# Patient Record
Sex: MALE | Race: WHITE | NOT HISPANIC OR LATINO | Employment: FULL TIME | URBAN - NONMETROPOLITAN AREA
[De-identification: names, ages, dates, MRNs, and addresses within clinical notes are randomized per-mention and may not be internally consistent; named-entity substitution may affect disease eponyms.]

---

## 2018-01-23 ENCOUNTER — HOSPITAL ENCOUNTER (EMERGENCY)
Facility: HOSPITAL | Age: 33
Discharge: HOME | End: 2018-01-23
Attending: NURSE PRACTITIONER | Admitting: NURSE PRACTITIONER
Payer: COMMERCIAL

## 2018-01-23 VITALS
TEMPERATURE: 98.9 F | DIASTOLIC BLOOD PRESSURE: 79 MMHG | HEIGHT: 71 IN | HEART RATE: 84 BPM | OXYGEN SATURATION: 97 % | SYSTOLIC BLOOD PRESSURE: 131 MMHG | RESPIRATION RATE: 20 BRPM

## 2018-01-23 DIAGNOSIS — J06.9 UPPER RESPIRATORY TRACT INFECTION, UNSPECIFIED TYPE: ICD-10-CM

## 2018-01-23 LAB
DEPRECATED S PYO AG THROAT QL EIA: NORMAL
FLUAV+FLUBV AG SPEC QL: NEGATIVE
FLUAV+FLUBV AG SPEC QL: NEGATIVE
SPECIMEN SOURCE: NORMAL
SPECIMEN SOURCE: NORMAL

## 2018-01-23 PROCEDURE — 87880 STREP A ASSAY W/OPTIC: CPT | Performed by: FAMILY MEDICINE

## 2018-01-23 PROCEDURE — 87081 CULTURE SCREEN ONLY: CPT | Performed by: FAMILY MEDICINE

## 2018-01-23 PROCEDURE — 87804 INFLUENZA ASSAY W/OPTIC: CPT | Mod: 59 | Performed by: FAMILY MEDICINE

## 2018-01-23 PROCEDURE — 40000275 ZZH STATISTIC RCP TIME EA 10 MIN

## 2018-01-23 PROCEDURE — 99203 OFFICE O/P NEW LOW 30 MIN: CPT | Performed by: NURSE PRACTITIONER

## 2018-01-23 PROCEDURE — 94664 DEMO&/EVAL PT USE INHALER: CPT

## 2018-01-23 PROCEDURE — G0463 HOSPITAL OUTPT CLINIC VISIT: HCPCS | Mod: 25

## 2018-01-23 RX ORDER — ALBUTEROL SULFATE 90 UG/1
2 AEROSOL, METERED RESPIRATORY (INHALATION) EVERY 4 HOURS PRN
Qty: 1 INHALER | Refills: 0 | Status: SHIPPED | OUTPATIENT
Start: 2018-01-23 | End: 2022-08-22

## 2018-01-23 ASSESSMENT — ENCOUNTER SYMPTOMS
CHILLS: 1
SINUS PRESSURE: 1
SORE THROAT: 1
HEADACHES: 1
MYALGIAS: 1
FATIGUE: 1
COUGH: 1
APPETITE CHANGE: 1
ARTHRALGIAS: 1
FEVER: 1

## 2018-01-23 NOTE — ED AVS SNAPSHOT
[facility]    [address] [address]    Phone:  [phone]                                       Odilon Enriquez   MRN: [id number]    Department:  [facility]   Date of Visit:  1/23/2018           Patient Information     Date Of Birth          1985        Your diagnoses for this visit were:     Upper respiratory tract infection, unspecified type        You were seen by Malathi Patel NP.      Follow-up Information     Follow up with [facility].    Specialty:  EMERGENCY MEDICINE    Why:  As needed, If symptoms worsen, or concerns develop    Contact information:    [address] [address] [phone]    Additional information:    From [facility]: Take [address]. Turn left at [address]. Turn left at the first stoplight on [address]. At the first stop sign, take a right onto [address]. Take a left into the parking lot and continue through until you reach the North enterance of the building.       From [place]: Take [address]. Take the [address] ramp towards [place]. Turn left onto [address]. Take a slight right onto [address]. Turn left at the first stoplight on [address]. At the first stop sign, take a right onto [address]. Take a left into the parking lot and continue through until you reach the North enterance of the building.       From [place]: Take [address]. Take a right at [address]. At the first stop sign, take a right onto [address]. Take a left into the parking lot and continue through until you reach the North enterance of the building.         Follow up with Janeth Tinoco NP.    Why:  As needed, if symptoms do not improve    Contact information:    [facility]  [address] [facility] [address]  [phone]          Discharge Instructions         Viral Upper Respiratory Illness (Adult)  You have a  viral upper respiratory illness (URI), which is another term for the common cold. This illness is contagious during the first few days. It is spread through the air by coughing and sneezing. It may also be spread by direct contact (touching the sick person and then touching your own eyes, nose, or mouth). Frequent handwashing will decrease risk of spread. Most viral illnesses go away within 7 to 10 days with rest and simple home remedies. Sometimes the illness may last for several weeks. Antibiotics will not kill a virus, and they are generally not prescribed for this condition.    Home care    If symptoms are severe, rest at home for the first 2 to 3 days. When you resume activity, don't let yourself get too tired.    Avoid being exposed to cigarette smoke (yours or others ).    You may use acetaminophen or ibuprofen to control pain and fever, unless another medicine was prescribed. (Note: If you have chronic liver or kidney disease, have ever had a stomach ulcer or gastrointestinal bleeding, or are taking blood-thinning medicines, talk with your healthcare provider before using these medicines.) Aspirin should never be given to anyone under 18 years of age who is ill with a viral infection or fever. It may cause severe liver or brain damage.    Your appetite may be poor, so a light diet is fine. Avoid dehydration by drinking 6 to 8 glasses of fluids per day (water, soft drinks, juices, tea, or soup). Extra fluids will help loosen secretions in the nose and lungs.    Over-the-counter cold medicines will not shorten the length of time you re sick, but they may be helpful for the following symptoms: cough, sore throat, and nasal and sinus congestion. (Note: Do not use decongestants if you have high blood pressure.)  Follow-up care  Follow up with your healthcare provider, or as advised.  When to seek medical advice  Call your healthcare provider right away if any of these occur:    Cough with lots of colored sputum  (mucus)    Severe headache; face, neck, or ear pain    Difficulty swallowing due to throat pain    Fever of 100.4 F (38 C)  Call 911, or get immediate medical care  Call emergency services right away if any of these occur:    Chest pain, shortness of breath, wheezing, or difficulty breathing    Coughing up blood    Inability to swallow due to throat pain  Date Last Reviewed: 9/13/2015 2000-2017 [employer]. [address] [place] [address]. All rights reserved. This information is not intended as a substitute for professional medical care. Always follow your healthcare professional's instructions.             Review of your medicines      START taking        Dose / Directions Last dose taken    albuterol 108 (90 BASE) MCG/ACT Inhaler   Commonly known as:  PROAIR HFA   Dose:  2 puff   Quantity:  1 Inhaler        Inhale 2 puffs into the lungs every 4 hours as needed for shortness of breath / dyspnea   Refills:  0                Prescriptions were sent or printed at these locations (1 Prescription)                   [facility] [address] [place] [address] [address] [address]   [address], [facility] [address]    Telephone:  [phone]   Fax:  [phone]   Hours:                  E-Prescribed (1 of 1)         albuterol (PROAIR HFA) 108 (90 BASE) MCG/ACT Inhaler                Procedures and tests performed during your visit     Beta strep group A culture    Influenza A/B antigen    Rapid strep screen      Orders Needing Specimen Collection     None      Pending Results     Date and Time Order Name Status Description    1/23/2018 1025 Beta strep group A culture In process             Pending Culture Results     Date and Time Order Name Status Description    1/23/2018 1025 Beta strep group A culture In process             Thank you for choosing [facility]       Thank you for choosing [facility] for your care. Our goal is always to provide you with excellent care.  "Hearing back from our patients is one way we can continue to improve our services. Please take a few minutes to complete the written survey that you may receive in the mail after you visit with us. Thank you!        [employer] Information     [employer] lets you send messages to your doctor, view your test results, renew your prescriptions, schedule appointments and more. To sign up, go to [email or web address] . Click on \"Log in\" on the left side of the screen, which will take you to the Welcome page. Then click on \"Sign up Now\" on the right side of the page.     You will be asked to enter the access code listed below, as well as some personal information. Please follow the directions to create your username and password.     Your access code is: [email or web address]  Expires: 2018 11:08 AM     Your access code will  in 90 days. If you need help or a new code, please call your [place] clinic or [phone].        Care EveryWhere ID     This is your Care EveryWhere ID. This could be used by other organizations to access your [facility] medical records  [id number]        Equal Access to Services     EMERITA RICHARDSON : Hadava Piña, glaileo taylor, melissa millan, malia cha . So [facility] [phone].    ATENCIÓN: Si habla español, tiene a thomas disposición servicios gratuitos de asistencia lingüística. Llame al [phone].    We comply with applicable federal civil rights laws and [place] laws. We do not discriminate on the basis of race, color, national origin, age, disability, sex, sexual orientation, or gender identity.            After Visit Summary       This is your record. Keep this with you and show to your community pharmacist(s) and doctor(s) at your next visit.                  "

## 2018-01-23 NOTE — ED AVS SNAPSHOT
[facility]    [address] [address]    Phone:  [phone]                                       Odilon Enriquez   MRN: [id number]    Department[facility]  [facility]   Date of Visit:  1/23/2018           After Visit Summary Signature Page     I have received my discharge instructions, and my questions have been answered. I have discussed any challenges I see with this plan with the nurse or doctor.    ..........................................................................................................................................  Patient/Patient Representative Signature      ..........................................................................................................................................  Patient Representative Print Name and Relationship to Patient    ..................................................               ................................................  Date                                            Time    ..........................................................................................................................................  Reviewed by Signature/Title    ...................................................              ..............................................  Date                                                            Time

## 2018-01-23 NOTE — LETTER
January 23, 2018      To Whom It May Concern:      Odilon Enriquez was seen in our Urgent Care Department today, 01/23/18.  I expect his condition to improve over the next 1-4 days.  He may return to work/school when improved.    Sincerely,        Malathi Patel, NP

## 2018-01-23 NOTE — DISCHARGE INSTRUCTIONS

## 2018-01-23 NOTE — ED NOTES
Pt presents with dry cough,fever, body aches, chest pressure that goes away when he moves around, decreased appetite since Saturday.Took Tylenol Cold  and Sore Throat at 0900.

## 2018-01-23 NOTE — PROGRESS NOTES
MDI with chamber instruct done. Patient understands instruction. Patient sent home with paper instructions and chamber

## 2018-01-23 NOTE — ED PROVIDER NOTES
"  History     Chief Complaint   Patient presents with     Cough     Saturday started with dry cough, now productive of small amt white sputum, fever/chills     The history is provided by the patient. No  was used.     Odilon Enriquez is a 32 year old male who presents today with a CC of cough, fever, chills, body aches, intermittent chest pressure that improves with movement.  Appetite has been poor.  He has been pushing fluids.  He has been taking tylenol cold and sore throat without much improvement.  No known close contacts with similar symptoms, he notes there were a couple of people at work sick.  He denies chronic medical conditions.      Problem List:    There are no active problems to display for this patient.       Past Medical History:    History reviewed. No pertinent past medical history.    Past Surgical History:    No past surgical history on file.    Family History:    No family history on file.    Social History:  Marital Status:   [2]  Social History   Substance Use Topics     Smoking status: Not on file     Smokeless tobacco: Not on file     Alcohol use Not on file        Medications:      albuterol (PROAIR HFA) 108 (90 BASE) MCG/ACT Inhaler         Review of Systems   Constitutional: Positive for appetite change, chills, fatigue and fever.   HENT: Positive for sinus pressure and sore throat. Negative for ear pain.    Respiratory: Positive for cough.    Musculoskeletal: Positive for arthralgias and myalgias.   Neurological: Positive for headaches.       Physical Exam   BP: 131/79  Pulse: 84  Temp: 98.9  F (37.2  C)  Resp: 20  Height: 180.3 cm (5' 11\")  SpO2: 97 %      Physical Exam   Constitutional: He is oriented to person, place, and time. He appears well-developed. He is cooperative.  Non-toxic appearance. He appears ill. No distress.   HENT:   Head: Normocephalic and atraumatic.   Right Ear: Tympanic membrane, external ear and ear canal normal.   Left Ear: Tympanic " membrane, external ear and ear canal normal.   Mouth/Throat: Uvula is midline and oropharynx is clear and moist.   Eyes: Conjunctivae are normal.   Neck: Normal range of motion. Neck supple.   Cardiovascular: Normal rate and regular rhythm.    Pulmonary/Chest: Effort normal and breath sounds normal.   Lymphadenopathy:     He has no cervical adenopathy.   Neurological: He is alert and oriented to person, place, and time.   Skin: Skin is warm and dry.   Psychiatric: He has a normal mood and affect. His behavior is normal.   Nursing note and vitals reviewed.      ED Course     ED Course     Procedures    Results for orders placed or performed during the hospital encounter of 01/23/18   Influenza A/B antigen   Result Value Ref Range    Influenza A/B Agn Specimen Nares     Influenza A Negative NEG^Negative    Influenza B Negative NEG^Negative   Rapid strep screen   Result Value Ref Range    Specimen Description Throat     Rapid Strep A Screen       NEGATIVE: No Group A streptococcal antigen detected by immunoassay, await culture report.   Beta strep group A culture   Result Value Ref Range    Specimen Description Throat     Culture Micro No beta hemolytic Streptococcus Group A isolated        Assessments & Plan (with Medical Decision Making)     I have reviewed the nursing notes.    I have reviewed the findings, diagnosis, plan and need for follow up with the patient.  ASSESSMENT / PLAN:  (J06.9) Upper respiratory tract infection, unspecified type  Comment: symptomatic, influenza neg  Plan:  Patient verbally educated and given appropriate education sheets for each of their diagnoses and has no questions.   Symptomatic treatments such as those listed below are recommended as needed:   Take OTC motrin or tylenol as directed on the bottle as needed.   Cool mist humidifier at bedside    Discussed Elderberry supplement for antiviral support   Albuterol as prescribed for feeling of chest tightness - RT instructed on use   May  try safely elevating HOB or sleeping in recliner   Take OTC cold medicine as directed on bottle - check ingredients, many are multi symptom and may contain tylenol or motrin   Frequent sips of non-caffeine fluids, rest, wash hands often   Sinus rinses such as a netti pot may help with sinus symptoms   Return to ED/UC if symptoms worsen or concerns develop: shortness of breath,     chest pain, unable to control fever < 103 with medications, persistent vomiting, signs/symptoms of dehydration.   If symptoms persist follow up with PCP for re-evaluation.      Discharge Medication List as of 1/23/2018 11:08 AM      START taking these medications    Details   albuterol (PROAIR HFA) 108 (90 BASE) MCG/ACT Inhaler Inhale 2 puffs into the lungs every 4 hours as needed for shortness of breath / dyspnea, Disp-1 Inhaler, R-0, E-Prescribe             Final diagnoses:   Upper respiratory tract infection, unspecified type       1/23/2018   [facility]     Malathi Patel NP  01/25/18 0044

## 2018-01-25 LAB
BACTERIA SPEC CULT: NORMAL
SPECIMEN SOURCE: NORMAL

## 2019-08-20 ENCOUNTER — TELEPHONE (OUTPATIENT)
Dept: FAMILY MEDICINE | Facility: OTHER | Age: 34
End: 2019-08-20

## 2019-08-20 ENCOUNTER — ALLIED HEALTH/NURSE VISIT (OUTPATIENT)
Dept: FAMILY MEDICINE | Facility: OTHER | Age: 34
End: 2019-08-20
Attending: NURSE PRACTITIONER
Payer: COMMERCIAL

## 2019-08-20 DIAGNOSIS — Z23 NEED FOR TDAP VACCINATION: Primary | ICD-10-CM

## 2019-08-20 PROCEDURE — 90471 IMMUNIZATION ADMIN: CPT

## 2019-08-20 PROCEDURE — 90715 TDAP VACCINE 7 YRS/> IM: CPT

## 2019-08-20 NOTE — TELEPHONE ENCOUNTER
Patient would like to get a tetanus shot. Stated he hasn't had his since 2002 and he got a small cut from a aniceto piece of metal and would like to get this done. Offered him an appointment with his pcp, but he is unable to come in this morning and would like if he could just get the injection done in the shot room. Please call back [phone] and it is ok to leave message.

## 2019-08-20 NOTE — TELEPHONE ENCOUNTER
Left message for patient to return call. Per Janeth patient is ok to receive the TDAP in the shot room.

## 2021-04-13 ENCOUNTER — OFFICE VISIT (OUTPATIENT)
Dept: CHIROPRACTIC MEDICINE | Facility: OTHER | Age: 36
End: 2021-04-13
Attending: CHIROPRACTOR
Payer: COMMERCIAL

## 2021-04-13 DIAGNOSIS — M99.01 SEGMENTAL AND SOMATIC DYSFUNCTION OF CERVICAL REGION: Primary | ICD-10-CM

## 2021-04-13 DIAGNOSIS — M99.02 SEGMENTAL AND SOMATIC DYSFUNCTION OF THORACIC REGION: ICD-10-CM

## 2021-04-13 DIAGNOSIS — M54.2 CERVICALGIA: ICD-10-CM

## 2021-04-13 PROCEDURE — 98940 CHIROPRACT MANJ 1-2 REGIONS: CPT | Mod: AT | Performed by: CHIROPRACTOR

## 2021-04-14 NOTE — PROGRESS NOTES
Subjective Finding:    Chief compalint: Patient presents with:  Neck Pain: sharp pains  , Pain Scale: 8/10, Intensity: sharp, Duration: 1 day, Radiating: no.    Date of injury:     Activities that the pain restricts:   Home/household/hobbies/social activities: no.  Work duties: no.  Sleep: no.  Makes symptoms better: rest.  Makes symptoms worse: activity, cervical extension and cervical flexion.  Have you seen anyone else for the symptoms? No.  Work related: no.  Automobile related injury: no.    Objective and Assessment:    Posture Analysis:   High shoulder: right.  Head tilt: right.  High iliac crest: .  Head carriage: forward.  Thoracic Kyphosis: neutral.  Lumbar Lordosis: neutral.    Lumbar Range of Motion: .  Cervical Range of Motion: extension decreased, left lateral flexion decreased and right lateral flexion decreased.  Thoracic Range of Motion: .  Extremity Range of Motion: .    Palpation:   Lev scapulae: sharp pain, referred pain: yes    Segmental dysfunction pre-treatment and treatment area: C3, C6, T1 and T3.    Assessment post-treatment:  Cervical: ROM increased.  Thoracic: ROM increased.  Lumbar: .    Comments: .      Complicating Factors: .    Plan / Procedure:    Treatment plan: PRN.  Instructed patient: stretch as instructed at visit.  Short term goals: reduce pain and increase ROM.  Long term goals: restore normal function.  Prognosis: excellent.

## 2021-04-19 ENCOUNTER — OFFICE VISIT (OUTPATIENT)
Dept: CHIROPRACTIC MEDICINE | Facility: OTHER | Age: 36
End: 2021-04-19
Attending: CHIROPRACTOR
Payer: COMMERCIAL

## 2021-04-19 DIAGNOSIS — M99.01 SEGMENTAL AND SOMATIC DYSFUNCTION OF CERVICAL REGION: Primary | ICD-10-CM

## 2021-04-19 DIAGNOSIS — M99.02 SEGMENTAL AND SOMATIC DYSFUNCTION OF THORACIC REGION: ICD-10-CM

## 2021-04-19 DIAGNOSIS — M54.2 CERVICALGIA: ICD-10-CM

## 2021-04-19 PROCEDURE — 98940 CHIROPRACT MANJ 1-2 REGIONS: CPT | Mod: AT | Performed by: CHIROPRACTOR

## 2021-04-22 NOTE — PROGRESS NOTES
Subjective Finding:    Chief compalint: Patient presents with:  Neck Pain  , Pain Scale: 8/10, Intensity: sharp, Duration: 1 day, Radiating: no.    Date of injury:     Activities that the pain restricts:   Home/household/hobbies/social activities: no.  Work duties: no.  Sleep: no.  Makes symptoms better: rest.  Makes symptoms worse: activity, cervical extension and cervical flexion.  Have you seen anyone else for the symptoms? No.  Work related: no.  Automobile related injury: no.    Objective and Assessment:    Posture Analysis:   High shoulder: right.  Head tilt: right.  High iliac crest: .  Head carriage: forward.  Thoracic Kyphosis: neutral.  Lumbar Lordosis: neutral.    Lumbar Range of Motion: .  Cervical Range of Motion: extension decreased, left lateral flexion decreased and right lateral flexion decreased.  Thoracic Range of Motion: .  Extremity Range of Motion: .    Palpation:   Lev scapulae: sharp pain, referred pain: yes    Segmental dysfunction pre-treatment and treatment area: C3, C6, T1 and T3.    Assessment post-treatment:  Cervical: ROM increased.  Thoracic: ROM increased.  Lumbar: .    Comments: .      Complicating Factors: .    Plan / Procedure:    Treatment plan: PRN.  Instructed patient: stretch as instructed at visit.  Short term goals: reduce pain and increase ROM.  Long term goals: restore normal function.  Prognosis: excellent.

## 2022-08-17 ENCOUNTER — TELEPHONE (OUTPATIENT)
Dept: FAMILY MEDICINE | Facility: OTHER | Age: 37
End: 2022-08-17

## 2022-08-22 ENCOUNTER — OFFICE VISIT (OUTPATIENT)
Dept: FAMILY MEDICINE | Facility: OTHER | Age: 37
End: 2022-08-22
Attending: FAMILY MEDICINE
Payer: COMMERCIAL

## 2022-08-22 VITALS
BODY MASS INDEX: 28.59 KG/M2 | TEMPERATURE: 98.9 F | WEIGHT: 205 LBS | DIASTOLIC BLOOD PRESSURE: 70 MMHG | HEART RATE: 74 BPM | OXYGEN SATURATION: 99 % | SYSTOLIC BLOOD PRESSURE: 120 MMHG | RESPIRATION RATE: 16 BRPM

## 2022-08-22 DIAGNOSIS — R35.0 URINARY FREQUENCY: Primary | ICD-10-CM

## 2022-08-22 DIAGNOSIS — N50.89 TESTICLE LUMP: ICD-10-CM

## 2022-08-22 DIAGNOSIS — Z13.6 ENCOUNTER FOR LIPID SCREENING FOR CARDIOVASCULAR DISEASE: ICD-10-CM

## 2022-08-22 DIAGNOSIS — Z13.220 ENCOUNTER FOR LIPID SCREENING FOR CARDIOVASCULAR DISEASE: ICD-10-CM

## 2022-08-22 LAB
ALBUMIN UR-MCNC: NEGATIVE MG/DL
AMORPH CRY #/AREA URNS HPF: ABNORMAL /HPF
ANION GAP SERPL CALCULATED.3IONS-SCNC: 4 MMOL/L (ref 3–14)
APPEARANCE UR: ABNORMAL
BILIRUB UR QL STRIP: NEGATIVE
BUN SERPL-MCNC: 13 MG/DL (ref 7–30)
CALCIUM SERPL-MCNC: 9.1 MG/DL (ref 8.5–10.1)
CHLORIDE BLD-SCNC: 103 MMOL/L (ref 94–109)
CHOLEST SERPL-MCNC: 156 MG/DL
CO2 SERPL-SCNC: 29 MMOL/L (ref 20–32)
COLOR UR AUTO: YELLOW
CREAT SERPL-MCNC: 0.94 MG/DL (ref 0.66–1.25)
FASTING STATUS PATIENT QL REPORTED: NO
GFR SERPL CREATININE-BSD FRML MDRD: >90 ML/MIN/1.73M2
GLUCOSE BLD-MCNC: 107 MG/DL (ref 70–99)
GLUCOSE UR STRIP-MCNC: NEGATIVE MG/DL
HDLC SERPL-MCNC: 28 MG/DL
HGB UR QL STRIP: NEGATIVE
KETONES UR STRIP-MCNC: NEGATIVE MG/DL
LDLC SERPL CALC-MCNC: ABNORMAL MG/DL
LEUKOCYTE ESTERASE UR QL STRIP: NEGATIVE
NITRATE UR QL: NEGATIVE
NONHDLC SERPL-MCNC: 128 MG/DL
PH UR STRIP: 7.5 [PH] (ref 4.7–8)
POTASSIUM BLD-SCNC: 3.8 MMOL/L (ref 3.4–5.3)
RBC URINE: 1 /HPF
SODIUM SERPL-SCNC: 136 MMOL/L (ref 133–144)
SP GR UR STRIP: 1.02 (ref 1–1.03)
SQUAMOUS EPITHELIAL: 0 /HPF
TRIGL SERPL-MCNC: 759 MG/DL
UROBILINOGEN UR STRIP-MCNC: NORMAL MG/DL
WBC URINE: 0 /HPF

## 2022-08-22 PROCEDURE — 36415 COLL VENOUS BLD VENIPUNCTURE: CPT | Performed by: FAMILY MEDICINE

## 2022-08-22 PROCEDURE — 99203 OFFICE O/P NEW LOW 30 MIN: CPT | Performed by: FAMILY MEDICINE

## 2022-08-22 PROCEDURE — 80048 BASIC METABOLIC PNL TOTAL CA: CPT | Performed by: FAMILY MEDICINE

## 2022-08-22 PROCEDURE — 80061 LIPID PANEL: CPT | Performed by: FAMILY MEDICINE

## 2022-08-22 PROCEDURE — 81001 URINALYSIS AUTO W/SCOPE: CPT | Performed by: FAMILY MEDICINE

## 2022-08-22 ASSESSMENT — PAIN SCALES - GENERAL: PAINLEVEL: NO PAIN (0)

## 2022-08-22 ASSESSMENT — ENCOUNTER SYMPTOMS
HEMATURIA: 0
FEVER: 0
ABDOMINAL PAIN: 0

## 2022-08-22 NOTE — PROGRESS NOTES
Assessment & Plan     Urinary frequency  - Basic metabolic panel  - UA reflex to Microscopic and Culture - [place]    Testicle lump  - US Testicular & Scrotum w Doppler Ltd; Future    Encounter for lipid screening for cardiovascular disease  - Lipid Profile (Chol, Trig, HDL, LDL calc)      I'm not feeling a lump today, but we'll get an ultrasound to make sure.  Check UA and BMP for his urinary frequency (chronic and likely due to fluid intake).  Lipid screening as he does not come in often.      KONSTANTIN COBOS,   [facility] - LUIS Donald is a 37 year old accompanied by his spouse, presenting for the following health issues:  Testicular Problem      HPI     37-year-old male accompanied today by his wife.  She states it is very hard to get him to go to the doctor.  About 2 months ago she noticed a scrotal lump which has not resolved and that is why he is here for evaluation at her insistence.  He himself does not notice any lumps or changes.  No pain.  On review of systems, he does have frequent urination however he drinks lots of fluids all day long.  He is not having any hesitancy, retention, reduced flow, urgency, dysuria, hematuria.  No back pain, perineal pain, flank pain.  No concern for any STDs.  No FamHx of testicular cancer.  PSH - vasectomy.    Genitourinary - Male  Onset/Duration: 2 months  Description:   Dysuria (painful urination): No}  Hematuria (blood in urine): No  Frequency: YES- for 24 hour period  Waking at night to urinate: No  Hesitancy (delay in urine): No  Retention (unable to empty): No  Decrease in urinary flow: No  Incontinence: No  Progression of Symptoms:  same  Accompanying Signs & Symptoms:  Fever: No  Back/Flank pain: No  Urethral discharge: No  Testicle lumps/masses/pain: YES  Nausea and/or vomiting: No  Abdominal pain: No  History:   History of frequent UTI s: No  History of kidney stones: No  History of hernias: No  Personal or Family history of  Prostate problems: No  Sexually active: YES  Precipitating or alleviating factors: None  Therapies tried and outcome: none      Review of Systems   Constitutional: Negative for fever.   Gastrointestinal: Negative for abdominal pain.   Genitourinary: Negative for genital sores, hematuria, penile discharge, penile pain, scrotal swelling and testicular pain.            Objective    /70 (BP Location: Left arm, Patient Position: Sitting, Cuff Size: Adult Large)   Pulse 74   Temp 98.9  F (37.2  C) (Tympanic)   Resp 16   Wt 93 kg (205 lb)   SpO2 99%   BMI 28.59 kg/m    Body mass index is 28.59 kg/m .  Physical Exam  Constitutional:       General: He is not in acute distress.     Appearance: Normal appearance.   Cardiovascular:      Rate and Rhythm: Normal rate and regular rhythm.      Heart sounds: Normal heart sounds. No murmur heard.  Pulmonary:      Effort: Pulmonary effort is normal.      Breath sounds: Normal breath sounds. No wheezing.   Abdominal:      General: Bowel sounds are normal.      Palpations: Abdomen is soft.      Tenderness: There is no abdominal tenderness.   Genitourinary:     Penis: Normal.       Testes: Normal.   Neurological:      Mental Status: He is alert and oriented to person, place, and time.                .  ..

## 2022-08-22 NOTE — NURSING NOTE
"No chief complaint on file.      Initial /70 (BP Location: Left arm, Patient Position: Sitting, Cuff Size: Adult Large)   Pulse 74   Temp 98.9  F (37.2  C) (Tympanic)   Resp 16   Wt 93 kg (205 lb)   SpO2 99%   BMI 28.59 kg/m   Estimated body mass index is 28.59 kg/m  as calculated from the following:    Height as of 1/23/18: 1.803 m (5' 11\").    Weight as of this encounter: 93 kg (205 lb).  Medication Reconciliation: complete  Renny Narvaez LPN    "

## 2022-08-24 ENCOUNTER — HOSPITAL ENCOUNTER (OUTPATIENT)
Dept: ULTRASOUND IMAGING | Facility: HOSPITAL | Age: 37
Discharge: HOME | End: 2022-08-24
Attending: FAMILY MEDICINE | Admitting: FAMILY MEDICINE
Payer: COMMERCIAL

## 2022-08-24 DIAGNOSIS — N50.89 TESTICLE LUMP: ICD-10-CM

## 2022-08-24 PROCEDURE — 76870 US EXAM SCROTUM: CPT

## 2023-06-07 ENCOUNTER — OFFICE VISIT (OUTPATIENT)
Dept: OTOLARYNGOLOGY | Facility: OTHER | Age: 38
End: 2023-06-07
Attending: NURSE PRACTITIONER
Payer: COMMERCIAL

## 2023-06-07 ENCOUNTER — TELEPHONE (OUTPATIENT)
Dept: OTOLARYNGOLOGY | Facility: OTHER | Age: 38
End: 2023-06-07

## 2023-06-07 VITALS
WEIGHT: 205 LBS | SYSTOLIC BLOOD PRESSURE: 118 MMHG | HEART RATE: 77 BPM | HEIGHT: 71 IN | TEMPERATURE: 98.6 F | BODY MASS INDEX: 28.7 KG/M2 | OXYGEN SATURATION: 97 % | DIASTOLIC BLOOD PRESSURE: 60 MMHG

## 2023-06-07 DIAGNOSIS — R09.81 NASAL CONGESTION: ICD-10-CM

## 2023-06-07 DIAGNOSIS — J30.2 SEASONAL ALLERGIC RHINITIS, UNSPECIFIED TRIGGER: Primary | ICD-10-CM

## 2023-06-07 DIAGNOSIS — J34.89 SINUS PRESSURE: ICD-10-CM

## 2023-06-07 PROCEDURE — 36415 COLL VENOUS BLD VENIPUNCTURE: CPT | Performed by: NURSE PRACTITIONER

## 2023-06-07 PROCEDURE — 99214 OFFICE O/P EST MOD 30 MIN: CPT | Mod: 25 | Performed by: NURSE PRACTITIONER

## 2023-06-07 PROCEDURE — 31231 NASAL ENDOSCOPY DX: CPT | Performed by: NURSE PRACTITIONER

## 2023-06-07 PROCEDURE — 86003 ALLG SPEC IGE CRUDE XTRC EA: CPT | Mod: 90 | Performed by: NURSE PRACTITIONER

## 2023-06-07 PROCEDURE — 82785 ASSAY OF IGE: CPT | Mod: 90 | Performed by: NURSE PRACTITIONER

## 2023-06-07 RX ORDER — AZELASTINE HYDROCHLORIDE, FLUTICASONE PROPIONATE 137; 50 UG/1; UG/1
1 SPRAY, METERED NASAL 2 TIMES DAILY
Qty: 23 G | Refills: 11 | Status: SHIPPED | OUTPATIENT
Start: 2023-06-07

## 2023-06-07 RX ORDER — CETIRIZINE HYDROCHLORIDE 10 MG/1
20 TABLET ORAL 2 TIMES DAILY
Qty: 180 TABLET | Refills: 3 | Status: SHIPPED | OUTPATIENT
Start: 2023-06-07 | End: 2024-05-20

## 2023-06-07 ASSESSMENT — PAIN SCALES - GENERAL: PAINLEVEL: NO PAIN (0)

## 2023-06-07 NOTE — PATIENT INSTRUCTIONS
Thank you for allowing Malathi Amanda and our ENT team to participate in your care.  If your medications are too expensive, please give the nurse a call.  We can possibly change this medication.  If you have a scheduling or an appointment question please contact our Health Unit Coordinator at their direct line [phone] ext [phone].   ALL nursing questions or concerns can be directed to your ENT nurse at: [phone] - [facility]     Start azelastine-fluticasone (DYMISTA) 137-50 MCG/ACT nasal spray,  Spray 1 spray into both nostrils 2 times daily    Start cetirizine (ZYRTEC) 10 MG tablet, Take 2 tablets (20 mg) by mouth 2 times daily,    Go to lab in the clinic to have an Inhalent Panel taken.  A nurse will call you with results in 2 weeks       Consider immunotherapy (allergy shots or drops)    Consider Septoplasty

## 2023-06-07 NOTE — TELEPHONE ENCOUNTER
Received PA Request from Blanca Lanza for Azelastine-Fluticasone 137-50MCG/ACT suspension.  PA Submitted on CMM, waiting for response.

## 2023-06-07 NOTE — PROGRESS NOTES
Otolaryngology Note         Chief Complaint:     Patient presents with:  Consult: Allergy            History of Present Illness:     Odilon Enriquez is a 38 year old male seen today for nasal congestion, itchy eyes, throat, sneezing    He is taking claritin D daily along with 6-10 benadryl per day    Nasal congestion is his most frustrating symptom.    Symptoms start once the snow starts melting and improve in August.      He has symptoms for the past 10 years, gradually worsening over time  No troubles swallowing  He has tried nasal flonase - worked initially   He has fatigue with benadryl  He is willing to try rinses  He has facial pain and pressure   He feels like his head is a balloon.    Didn't have allergies til 30's  + family history allergies - brother.      No concerns for sleep apnea  No  chronic cough  No shortness of breath    No ear itching or fullness in ears.      Resides in house with a basement.  There is no water or mold.  There is not carpet in the bedroom.    Heat source in home: baseboard/radiator  Pets: 3 dogs, no other pets.           Medications:     Current Outpatient Rx   Medication Sig Dispense Refill     azelastine-fluticasone (DYMISTA) 137-50 MCG/ACT nasal spray Spray 1 spray into both nostrils 2 times daily 23 g 11     cetirizine (ZYRTEC) 10 MG tablet Take 2 tablets (20 mg) by mouth 2 times daily 180 tablet 3            Allergies:     Allergies: Patient has no known allergies.          Past Medical History:     No past medical history on file.         Past Surgical History:     Past Surgical History:   Procedure Laterality Date     VASECTOMY         ENT family history reviewed         Social History:     Social History     Tobacco Use     Smoking status: Every Day     Types: Cigarettes     Start date: 1/1/1997     Smokeless tobacco: Current     Types: Chew   Vaping Use     Vaping status: Some Days   Substance Use Topics     Alcohol use: Yes     Comment: every weekend     Drug use: Never  "           Review of Systems:     ROS: See HPI         Physical Exam:     /60 (BP Location: Right arm, Patient Position: Sitting, Cuff Size: Adult Regular)   Pulse 77   Temp 98.6  F (37  C) (Tympanic)   Ht 1.803 m (5' 11\")   Wt 93 kg (205 lb)   SpO2 97%   BMI 28.59 kg/m      General - The patient is well nourished and well developed, and appears to have good nutritional status.    Head and Face - Normocephalic and atraumatic, with no gross asymmetry noted.  The facial nerve is intact, with strong symmetric movements.  Voice and Breathing - The patient was breathing comfortably without the use of accessory muscles. There was no wheezing, stridor, or stertor.  The patients voice was clear and strong, and had appropriate pitch and quality.  Ears -The external auditory canals are patent, the tympanic membranes are intact without effusion, retraction or mass.  Bony landmarks are intact.  Eyes - Extraocular movements intact, sclera were not icteric or injected, conjunctiva were pink and moist.  Mouth - Examination of the oral cavity showed pink, healthy oral mucosa. No lesions or ulcerations noted.  The tongue was mobile and midline, and the dentition were in good condition.    Throat - The walls of the oropharynx were smooth, pink, moist, symmetric, and had no lesions or ulcerations.  The tonsillar pillars and soft palate were symmetric.  The uvula was midline on elevation.   Neck - No worrisome lymphadenopathy.   Palpation of the thyroid was soft and smooth, with no nodules or goiter appreciated.  The trachea was mobile and midline.  Nose - External contour is symmetric, no gross deflection or scars.  The nasal passages are examined with nasal speculum.   Nasal mucosa is pink and moist with no abnormal mucus.      To evaluate the nose and sinuses, I performed rigid nasal endoscopy.  I sprayed both nares with 2 sprays lidocaine and neosynephrine.     I began with the RIGHT side using a 0 degree rigid nasal " endoscope, and then similarly examined the LEFT side     Findings: right septal spur  Inferior turbinates:  enlarged bilaterally  Middle turbinate and middle meatus:  enlarged  Superior meatus is examined and unremarkable  No Sphenoethmoidal purulence  Mucosa is pale and boggy,  no polyps nor polypoid degeneration  Nasopharynx clear, previous adenoidectomy scar  The patient tolerated the procedure well            Assessment and Plan:       ICD-10-CM    1. Seasonal allergic rhinitis, unspecified trigger  J30.2 Inhalent Panel [place] (Serolab)     Total IgE (Serolab)     cetirizine (ZYRTEC) 10 MG tablet     azelastine-fluticasone (DYMISTA) 137-50 MCG/ACT nasal spray     [place]halent Panel [place] (Serolab)     Total IgE (Serolab)        Start azelastine-fluticasone (DYMISTA) 137-50 MCG/ACT nasal spray,  Spray 1 spray into both nostrils 2 times daily    Start cetirizine (ZYRTEC) 10 MG tablet, Take 2 tablets (20 mg) by mouth 2 times daily,    Go to lab in the clinic to have an Inhalent Panel taken.  A nurse will call you with results in 2 weeks       Consider immunotherapy (allergy shots or drops)    Consider Septoplasty and turbinate reduction    Malathi Patel NP-C  [facility]

## 2023-06-07 NOTE — LETTER
6/7/2023         RE: Odilon Enriquez  [address] [address]        Dear Colleague,    Thank you for referring your patient, Odilon Enriquez, to the [facility]. Please see a copy of my visit note below.    Otolaryngology Note         Chief Complaint:     Patient presents with:  Consult: Allergy            History of Present Illness:     Odilon Enriquez is a 38 year old male seen today for nasal congestion, itchy eyes, throat, sneezing    He is taking claritin D daily along with 6-10 benadryl per day    Nasal congestion is his most frustrating symptom.    Symptoms start once the snow starts melting and improve in August.      He has symptoms for the past 10 years, gradually worsening over time  No troubles swallowing  He has tried nasal flonase - worked initially   He has fatigue with benadryl  He is willing to try rinses  He has facial pain and pressure   He feels like his head is a balloon.    Didn't have allergies til 30's  + family history allergies - brother.      No concerns for sleep apnea  No  chronic cough  No shortness of breath    No ear itching or fullness in ears.      Resides in house with a basement.  There is no water or mold.  There is not carpet in the bedroom.    Heat source in home: baseboard/radiator  Pets: 3 dogs, no other pets.           Medications:     Current Outpatient Rx   Medication Sig Dispense Refill     azelastine-fluticasone (DYMISTA) 137-50 MCG/ACT nasal spray Spray 1 spray into both nostrils 2 times daily 23 g 11     cetirizine (ZYRTEC) 10 MG tablet Take 2 tablets (20 mg) by mouth 2 times daily 180 tablet 3            Allergies:     Allergies: Patient has no known allergies.          Past Medical History:     No past medical history on file.         Past Surgical History:     Past Surgical History:   Procedure Laterality Date     VASECTOMY         ENT family history reviewed         Social History:     Social History     Tobacco Use     Smoking  "status: Every Day     Types: Cigarettes     Start date: 1/1/1997     Smokeless tobacco: Current     Types: Chew   Vaping Use     Vaping status: Some Days   Substance Use Topics     Alcohol use: Yes     Comment: every weekend     Drug use: Never            Review of Systems:     ROS: See HPI         Physical Exam:     /60 (BP Location: Right arm, Patient Position: Sitting, Cuff Size: Adult Regular)   Pulse 77   Temp 98.6  F (37  C) (Tympanic)   Ht 1.803 m (5' 11\")   Wt 93 kg (205 lb)   SpO2 97%   BMI 28.59 kg/m      General - The patient is well nourished and well developed, and appears to have good nutritional status.    Head and Face - Normocephalic and atraumatic, with no gross asymmetry noted.  The facial nerve is intact, with strong symmetric movements.  Voice and Breathing - The patient was breathing comfortably without the use of accessory muscles. There was no wheezing, stridor, or stertor.  The patients voice was clear and strong, and had appropriate pitch and quality.  Ears -The external auditory canals are patent, the tympanic membranes are intact without effusion, retraction or mass.  Bony landmarks are intact.  Eyes - Extraocular movements intact, sclera were not icteric or injected, conjunctiva were pink and moist.  Mouth - Examination of the oral cavity showed pink, healthy oral mucosa. No lesions or ulcerations noted.  The tongue was mobile and midline, and the dentition were in good condition.    Throat - The walls of the oropharynx were smooth, pink, moist, symmetric, and had no lesions or ulcerations.  The tonsillar pillars and soft palate were symmetric.  The uvula was midline on elevation.   Neck - No worrisome lymphadenopathy.   Palpation of the thyroid was soft and smooth, with no nodules or goiter appreciated.  The trachea was mobile and midline.  Nose - External contour is symmetric, no gross deflection or scars.  The nasal passages are examined with nasal speculum.   Nasal mucosa " is pink and moist with no abnormal mucus.      To evaluate the nose and sinuses, I performed rigid nasal endoscopy.  I sprayed both nares with 2 sprays lidocaine and neosynephrine.     I began with the RIGHT side using a 0 degree rigid nasal endoscope, and then similarly examined the LEFT side     Findings: right septal spur  Inferior turbinates:  enlarged bilaterally  Middle turbinate and middle meatus:  enlarged  Superior meatus is examined and unremarkable  No Sphenoethmoidal purulence  Mucosa is pale and boggy,  no polyps nor polypoid degeneration  Nasopharynx clear, previous adenoidectomy scar  The patient tolerated the procedure well            Assessment and Plan:       ICD-10-CM    1. Seasonal allergic rhinitis, unspecified trigger  J30.2 Inhalent Panel [place] (Serolab)     Total IgE (Serolab)     cetirizine (ZYRTEC) 10 MG tablet     azelastine-fluticasone (DYMISTA) 137-50 MCG/ACT nasal spray     [place]halent Panel [place] (Serolab)     Total IgE (Serolab)        Start azelastine-fluticasone (DYMISTA) 137-50 MCG/ACT nasal spray,  Spray 1 spray into both nostrils 2 times daily    Start cetirizine (ZYRTEC) 10 MG tablet, Take 2 tablets (20 mg) by mouth 2 times daily,    Go to lab in the clinic to have an Inhalent Panel taken.  A nurse will call you with results in 2 weeks       Consider immunotherapy (allergy shots or drops)    Consider Septoplasty and turbinate reduction    Malathi MORELOS  [facility]        Again, thank you for allowing me to participate in the care of your patient.        Sincerely,        Malathi Patel NP

## 2023-06-16 NOTE — TELEPHONE ENCOUNTER
Received Denial from [facility] for Azelastine-Fluticasone 137-50MCG/ACT suspension 06/16/2023.      Forms will be scanned to Epic.

## 2023-06-22 LAB
SCANNED LAB RESULT: NORMAL
SCANNED LAB RESULT: NORMAL

## 2023-06-23 NOTE — RESULT ENCOUNTER NOTE
Mild sensitivities to distal, pigweed, sheep [place] (weeds, more common in fall), trey, Poplar (trees) Alternaria (mold)    Moderate sensitivity to dust  Moderate to severe sensitivity to grass  Severe sensitivity to birch    Recommend to continue to follow the discharge instructions that we talked about at his last visit.  May consider allergy immunotherapy in the future.  Likely that spring and summer are going to be his worst seasons, this is consistent with his symptoms.    Follow-up if he is interested in starting immunotherapy.    Thanks JS

## 2024-05-07 ENCOUNTER — TRANSFERRED RECORDS (OUTPATIENT)
Dept: HEALTH INFORMATION MANAGEMENT | Facility: CLINIC | Age: 39
End: 2024-05-07

## 2024-05-07 LAB
ALT SERPL-CCNC: 92 U/L (ref 0–48)
AST SERPL-CCNC: 37 U/L (ref 0–38)
CHOLESTEROL (EXTERNAL): 174 MG/DL
CREATININE (EXTERNAL): 0.9 MG/DL (ref 0.7–1.3)
GFR ESTIMATED (EXTERNAL): 99 ML/MIN/1.73M2
GLUCOSE (EXTERNAL): 81 MG/DL (ref 65–100)
HDLC SERPL-MCNC: 41 MG/DL
LDL CHOLESTEROL CALCULATED (EXTERNAL): 80 MG/DL
POTASSIUM (EXTERNAL): 4.1 MMOL/L (ref 3.5–5.1)
TRIGLYCERIDES (EXTERNAL): 327 MG/DL
TSH SERPL-ACNC: 4.08 UIU/ML (ref 0.55–4.78)

## 2024-05-19 DIAGNOSIS — J30.2 SEASONAL ALLERGIC RHINITIS, UNSPECIFIED TRIGGER: ICD-10-CM

## 2024-05-20 RX ORDER — CETIRIZINE HYDROCHLORIDE 10 MG/1
20 TABLET ORAL 2 TIMES DAILY
Qty: 180 TABLET | Refills: 0 | Status: SHIPPED | OUTPATIENT
Start: 2024-05-20 | End: 2024-08-06

## 2024-05-20 NOTE — TELEPHONE ENCOUNTER
Zyrtec  Last Written Prescription Date: 6/7/23  Last Fill Quantity: 180 # of Refills: 3  Last Office Visit: 6/7/23

## 2024-05-21 ENCOUNTER — TELEPHONE (OUTPATIENT)
Dept: FAMILY MEDICINE | Facility: OTHER | Age: 39
End: 2024-05-21

## 2024-05-21 NOTE — TELEPHONE ENCOUNTER
Spoke with patient, he had results completed from another facility and unable to see them in Care Everywhere, he does have results with him and is going to drop them off tomorrow, they will then be given to his primary to review.

## 2024-05-21 NOTE — TELEPHONE ENCOUNTER
9:52 AM    Reason for Call: OVERBOOK    Patient is discuss lab results    The patient is requesting an appointment for FRIDAY May 24 with RAMIRO Tinoco.    Was an appointment offered for this call? No  If yes : Appointment type              Date    Preferred method for responding to this message: Telephone Call  What is your phone number ? [phone]     If we cannot reach you directly, may we leave a detailed response at the number you provided? Yes    Can this message wait until your PCP/provider returns, if unavailable today? Mary Jane Dos Santos

## 2024-05-21 NOTE — TELEPHONE ENCOUNTER
Patient returning the nurses phone call and he lab results from someplace else he doesn't know the person name or nurses name?     Phone [phone]

## 2024-05-22 ENCOUNTER — TELEPHONE (OUTPATIENT)
Dept: FAMILY MEDICINE | Facility: OTHER | Age: 39
End: 2024-05-22

## 2024-05-22 NOTE — TELEPHONE ENCOUNTER
Called patient to setup appointment to go over lab results with Janeth Tinoco. Left message.  Chapo Mayorga LPN

## 2024-05-23 NOTE — PROGRESS NOTES
"  Assessment & Plan     (D64.9) Anemia, unspecified type  (primary encounter diagnosis)  Plan: CBC with platelets and differential, Iron and         iron binding capacity, Immunos occult blood,         Vitamin B12, Folate, Ferritin        Will recheck labs. If abnormal, will consider colonoscopy.     (R79.89) Elevated LFTs  Plan: Comprehensive metabolic panel (BMP + Alb, Alk         Phos, ALT, AST, Total. Bili, TP), Hepatitis C         Screen Reflex to HCV RNA Quant and Genotype,         Hepatitis B Surface Antibody, Hepatitis B         surface antigen, Hepatitis A antibody IgM,         Reflex INR, GGT        Will recheck today. Does admit to drinking large amounts of alcohol.     (Z11.4) Screening for HIV (human immunodeficiency virus)  Plan: HIV Antigen Antibody Combo        Will notify patient of the results when available and intervene accordingly.     (Z13.220) Lipid screening  Plan: Lipid Profile (Chol, Trig, HDL, LDL calc)        Will notify patient of the results when available and intervene accordingly.       BMI  Estimated body mass index is 31 kg/m  as calculated from the following:    Height as of this encounter: 1.803 m (5' 11\").    Weight as of this encounter: 100.8 kg (222 lb 4.8 oz).   Weight management plan: Discussed healthy diet and exercise guidelines        Petey Donald is a 38 year old, presenting for the following health issues:  Results (Lab Results)    HPI     Patient was recently seen at a local integrative health clinic. Labs were done and he would like to discuss them.     Hgb was low at 12.9. He denies any episodes of bleeding. No dizziness or lightheadedness. Iron levels were also low. Grandfather with stomach cancer. Some heart burn. Does not take anything for this.     AST and ALT were high. Admits to drinking large amounts of alcohol. He notes that he typically drinks 20 drinks were week, varies between 16 ounce beer or shot. No Tylenol use. Did test positive for Hep C in the " "past, but his viral load was zero.     Lipids were also high. Has not eaten today.     No fevers. No weight loss. Some weight gain. No nausea or vomiting. No abdominal pain.         Review of Systems  Constitutional, HEENT, cardiovascular, pulmonary, gi and gu systems are negative, except as otherwise noted.      Objective    /86   Pulse 60   Temp 97.7  F (36.5  C) (Tympanic)   Resp 16   Ht 1.803 m (5' 11\")   Wt 100.8 kg (222 lb 4.8 oz)   SpO2 96%   BMI 31.00 kg/m    Body mass index is 31 kg/m .  Physical Exam   GENERAL: alert and no distress  EYES: Eyes grossly normal to inspection, PERRL and conjunctivae and sclerae normal  HENT: ear canals and TM's normal, nose and mouth without ulcers or lesions  NECK: no adenopathy, no asymmetry, masses, or scars  RESP: lungs clear to auscultation - no rales, rhonchi or wheezes  CV: regular rate and rhythm, normal S1 S2, no S3 or S4, no murmur, click or rub, no peripheral edema  ABDOMEN: soft, nontender, no hepatosplenomegaly, no masses and bowel sounds normal  MS: no gross musculoskeletal defects noted, no edema  NEURO: Normal strength and tone, mentation intact and speech normal  PSYCH: mentation appears normal, affect normal/bright    Labs in process        Signed Electronically by: Janeth Tinoco NP    "

## 2024-05-24 ENCOUNTER — OFFICE VISIT (OUTPATIENT)
Dept: FAMILY MEDICINE | Facility: OTHER | Age: 39
End: 2024-05-24
Attending: NURSE PRACTITIONER
Payer: COMMERCIAL

## 2024-05-24 VITALS
DIASTOLIC BLOOD PRESSURE: 86 MMHG | HEART RATE: 60 BPM | OXYGEN SATURATION: 96 % | BODY MASS INDEX: 31.12 KG/M2 | SYSTOLIC BLOOD PRESSURE: 124 MMHG | TEMPERATURE: 97.7 F | RESPIRATION RATE: 16 BRPM | HEIGHT: 71 IN | WEIGHT: 222.3 LBS

## 2024-05-24 DIAGNOSIS — D64.9 ANEMIA, UNSPECIFIED TYPE: Primary | ICD-10-CM

## 2024-05-24 DIAGNOSIS — Z13.220 LIPID SCREENING: ICD-10-CM

## 2024-05-24 DIAGNOSIS — Z11.4 SCREENING FOR HIV (HUMAN IMMUNODEFICIENCY VIRUS): ICD-10-CM

## 2024-05-24 DIAGNOSIS — R79.89 ELEVATED LFTS: ICD-10-CM

## 2024-05-24 LAB
ALBUMIN SERPL BCG-MCNC: 4.7 G/DL (ref 3.5–5.2)
ALP SERPL-CCNC: 66 U/L (ref 40–150)
ALT SERPL W P-5'-P-CCNC: 127 U/L (ref 0–70)
ANION GAP SERPL CALCULATED.3IONS-SCNC: 8 MMOL/L (ref 7–15)
AST SERPL W P-5'-P-CCNC: 47 U/L (ref 0–45)
BASOPHILS # BLD AUTO: 0.1 10E3/UL (ref 0–0.2)
BASOPHILS NFR BLD AUTO: 1 %
BILIRUB SERPL-MCNC: 1.1 MG/DL
BUN SERPL-MCNC: 8.5 MG/DL (ref 6–20)
CALCIUM SERPL-MCNC: 9.3 MG/DL (ref 8.6–10)
CHLORIDE SERPL-SCNC: 103 MMOL/L (ref 98–107)
CHOLEST SERPL-MCNC: 171 MG/DL
CREAT SERPL-MCNC: 0.95 MG/DL (ref 0.67–1.17)
DEPRECATED HCO3 PLAS-SCNC: 27 MMOL/L (ref 22–29)
EGFRCR SERPLBLD CKD-EPI 2021: >90 ML/MIN/1.73M2
EOSINOPHIL # BLD AUTO: 0.2 10E3/UL (ref 0–0.7)
EOSINOPHIL NFR BLD AUTO: 3 %
ERYTHROCYTE [DISTWIDTH] IN BLOOD BY AUTOMATED COUNT: 15 % (ref 10–15)
FASTING STATUS PATIENT QL REPORTED: YES
FASTING STATUS PATIENT QL REPORTED: YES
FERRITIN SERPL-MCNC: 525 NG/ML (ref 31–409)
FOLATE SERPL-MCNC: 11.3 NG/ML (ref 4.6–34.8)
GLUCOSE SERPL-MCNC: 99 MG/DL (ref 70–99)
HCT VFR BLD AUTO: 40.6 % (ref 40–53)
HDLC SERPL-MCNC: 41 MG/DL
HGB BLD-MCNC: 13 G/DL (ref 13.3–17.7)
IMM GRANULOCYTES # BLD: 0.1 10E3/UL
IMM GRANULOCYTES NFR BLD: 1 %
INR PPP: 0.92 (ref 0.85–1.15)
IRON BINDING CAPACITY (ROCHE): 293 UG/DL (ref 240–430)
IRON SATN MFR SERPL: 44 % (ref 15–46)
IRON SERPL-MCNC: 129 UG/DL (ref 61–157)
LDLC SERPL CALC-MCNC: 97 MG/DL
LYMPHOCYTES # BLD AUTO: 1.9 10E3/UL (ref 0.8–5.3)
LYMPHOCYTES NFR BLD AUTO: 34 %
MCH RBC QN AUTO: 20.8 PG (ref 26.5–33)
MCHC RBC AUTO-ENTMCNC: 32 G/DL (ref 31.5–36.5)
MCV RBC AUTO: 65 FL (ref 78–100)
MONOCYTES # BLD AUTO: 0.5 10E3/UL (ref 0–1.3)
MONOCYTES NFR BLD AUTO: 9 %
NEUTROPHILS # BLD AUTO: 2.8 10E3/UL (ref 1.6–8.3)
NEUTROPHILS NFR BLD AUTO: 52 %
NONHDLC SERPL-MCNC: 130 MG/DL
NRBC # BLD AUTO: 0 10E3/UL
NRBC BLD AUTO-RTO: 0 /100
PLATELET # BLD AUTO: 295 10E3/UL (ref 150–450)
POTASSIUM SERPL-SCNC: 4.2 MMOL/L (ref 3.4–5.3)
PROT SERPL-MCNC: 7.7 G/DL (ref 6.4–8.3)
RBC # BLD AUTO: 6.26 10E6/UL (ref 4.4–5.9)
SODIUM SERPL-SCNC: 138 MMOL/L (ref 135–145)
TRIGL SERPL-MCNC: 165 MG/DL
WBC # BLD AUTO: 5.4 10E3/UL (ref 4–11)

## 2024-05-24 PROCEDURE — 36415 COLL VENOUS BLD VENIPUNCTURE: CPT | Performed by: NURSE PRACTITIONER

## 2024-05-24 PROCEDURE — 87389 HIV-1 AG W/HIV-1&-2 AB AG IA: CPT | Performed by: NURSE PRACTITIONER

## 2024-05-24 PROCEDURE — 86709 HEPATITIS A IGM ANTIBODY: CPT | Performed by: NURSE PRACTITIONER

## 2024-05-24 PROCEDURE — 80053 COMPREHEN METABOLIC PANEL: CPT | Performed by: NURSE PRACTITIONER

## 2024-05-24 PROCEDURE — 83550 IRON BINDING TEST: CPT | Performed by: NURSE PRACTITIONER

## 2024-05-24 PROCEDURE — 86706 HEP B SURFACE ANTIBODY: CPT | Performed by: NURSE PRACTITIONER

## 2024-05-24 PROCEDURE — 82607 VITAMIN B-12: CPT | Performed by: NURSE PRACTITIONER

## 2024-05-24 PROCEDURE — 83540 ASSAY OF IRON: CPT | Performed by: NURSE PRACTITIONER

## 2024-05-24 PROCEDURE — 82746 ASSAY OF FOLIC ACID SERUM: CPT | Performed by: NURSE PRACTITIONER

## 2024-05-24 PROCEDURE — 87522 HEPATITIS C REVRS TRNSCRPJ: CPT | Performed by: NURSE PRACTITIONER

## 2024-05-24 PROCEDURE — 82977 ASSAY OF GGT: CPT | Performed by: NURSE PRACTITIONER

## 2024-05-24 PROCEDURE — 87340 HEPATITIS B SURFACE AG IA: CPT | Performed by: NURSE PRACTITIONER

## 2024-05-24 PROCEDURE — 80061 LIPID PANEL: CPT | Performed by: NURSE PRACTITIONER

## 2024-05-24 PROCEDURE — 86803 HEPATITIS C AB TEST: CPT | Performed by: NURSE PRACTITIONER

## 2024-05-24 PROCEDURE — 85610 PROTHROMBIN TIME: CPT | Performed by: NURSE PRACTITIONER

## 2024-05-24 PROCEDURE — 82728 ASSAY OF FERRITIN: CPT | Performed by: NURSE PRACTITIONER

## 2024-05-24 PROCEDURE — 85025 COMPLETE CBC W/AUTO DIFF WBC: CPT | Performed by: NURSE PRACTITIONER

## 2024-05-24 PROCEDURE — 99214 OFFICE O/P EST MOD 30 MIN: CPT | Performed by: NURSE PRACTITIONER

## 2024-05-24 ASSESSMENT — PAIN SCALES - GENERAL: PAINLEVEL: NO PAIN (0)

## 2024-05-25 LAB
GGT SERPL-CCNC: 51 U/L (ref 8–61)
HAV IGM SERPL QL IA: NONREACTIVE
HBV SURFACE AB SERPL IA-ACNC: <3.5 M[IU]/ML
HBV SURFACE AB SERPL IA-ACNC: NONREACTIVE M[IU]/ML
HBV SURFACE AG SERPL QL IA: NONREACTIVE
HCV AB SERPL QL IA: REACTIVE
HIV 1+2 AB+HIV1 P24 AG SERPL QL IA: NONREACTIVE
VIT B12 SERPL-MCNC: 523 PG/ML (ref 232–1245)

## 2024-05-27 ENCOUNTER — APPOINTMENT (OUTPATIENT)
Dept: LAB | Facility: HOSPITAL | Age: 39
End: 2024-05-27
Payer: COMMERCIAL

## 2024-05-27 DIAGNOSIS — D64.9 ANEMIA, UNSPECIFIED TYPE: Primary | ICD-10-CM

## 2024-05-27 DIAGNOSIS — R79.89 ELEVATED LFTS: ICD-10-CM

## 2024-05-27 LAB — HEMOCCULT SP1 STL QL: NEGATIVE

## 2024-05-27 PROCEDURE — 82274 ASSAY TEST FOR BLOOD FECAL: CPT | Performed by: NURSE PRACTITIONER

## 2024-05-28 LAB — HCV RNA SERPL NAA+PROBE-ACNC: NOT DETECTED IU/ML

## 2024-05-29 ENCOUNTER — LAB (OUTPATIENT)
Dept: LAB | Facility: OTHER | Age: 39
End: 2024-05-29
Payer: COMMERCIAL

## 2024-05-29 ENCOUNTER — HOSPITAL ENCOUNTER (OUTPATIENT)
Dept: ULTRASOUND IMAGING | Facility: HOSPITAL | Age: 39
Discharge: HOME | End: 2024-05-29
Attending: NURSE PRACTITIONER | Admitting: NURSE PRACTITIONER
Payer: COMMERCIAL

## 2024-05-29 DIAGNOSIS — R79.89 ELEVATED LFTS: ICD-10-CM

## 2024-05-29 DIAGNOSIS — D64.9 ANEMIA, UNSPECIFIED TYPE: ICD-10-CM

## 2024-05-29 LAB
BASOPHILS # BLD AUTO: 0 10E3/UL (ref 0–0.2)
BASOPHILS NFR BLD AUTO: 1 %
EOSINOPHIL # BLD AUTO: 0.3 10E3/UL (ref 0–0.7)
EOSINOPHIL NFR BLD AUTO: 6 %
ERYTHROCYTE [DISTWIDTH] IN BLOOD BY AUTOMATED COUNT: 14.6 % (ref 10–15)
HCT VFR BLD AUTO: 39.7 % (ref 40–53)
HGB BLD-MCNC: 12.5 G/DL (ref 13.3–17.7)
IMM GRANULOCYTES # BLD: 0 10E3/UL
IMM GRANULOCYTES NFR BLD: 1 %
LYMPHOCYTES # BLD AUTO: 1.5 10E3/UL (ref 0.8–5.3)
LYMPHOCYTES NFR BLD AUTO: 35 %
MCH RBC QN AUTO: 20.5 PG (ref 26.5–33)
MCHC RBC AUTO-ENTMCNC: 31.5 G/DL (ref 31.5–36.5)
MCV RBC AUTO: 65 FL (ref 78–100)
MONOCYTES # BLD AUTO: 0.5 10E3/UL (ref 0–1.3)
MONOCYTES NFR BLD AUTO: 11 %
NEUTROPHILS # BLD AUTO: 2.1 10E3/UL (ref 1.6–8.3)
NEUTROPHILS NFR BLD AUTO: 47 %
NRBC # BLD AUTO: 0 10E3/UL
NRBC BLD AUTO-RTO: 0 /100
PLATELET # BLD AUTO: 276 10E3/UL (ref 150–450)
RBC # BLD AUTO: 6.09 10E6/UL (ref 4.4–5.9)
RETICS # AUTO: 0.09 10E6/UL (ref 0.03–0.1)
RETICS/RBC NFR AUTO: 1.5 % (ref 0.5–2)
WBC # BLD AUTO: 4.4 10E3/UL (ref 4–11)

## 2024-05-29 PROCEDURE — 82103 ALPHA-1-ANTITRYPSIN TOTAL: CPT | Performed by: NURSE PRACTITIONER

## 2024-05-29 PROCEDURE — 85060 BLOOD SMEAR INTERPRETATION: CPT | Performed by: PATHOLOGY

## 2024-05-29 PROCEDURE — 82390 ASSAY OF CERULOPLASMIN: CPT

## 2024-05-29 PROCEDURE — 86039 ANTINUCLEAR ANTIBODIES (ANA): CPT

## 2024-05-29 PROCEDURE — 85045 AUTOMATED RETICULOCYTE COUNT: CPT

## 2024-05-29 PROCEDURE — 86256 FLUORESCENT ANTIBODY TITER: CPT | Mod: 90 | Performed by: NURSE PRACTITIONER

## 2024-05-29 PROCEDURE — 76705 ECHO EXAM OF ABDOMEN: CPT

## 2024-05-29 PROCEDURE — 83516 IMMUNOASSAY NONANTIBODY: CPT | Mod: 90 | Performed by: NURSE PRACTITIONER

## 2024-05-29 PROCEDURE — 85025 COMPLETE CBC W/AUTO DIFF WBC: CPT

## 2024-05-29 PROCEDURE — 86038 ANTINUCLEAR ANTIBODIES: CPT

## 2024-05-29 PROCEDURE — 36415 COLL VENOUS BLD VENIPUNCTURE: CPT | Performed by: NURSE PRACTITIONER

## 2024-05-30 ENCOUNTER — TELEPHONE (OUTPATIENT)
Dept: CARE COORDINATION | Facility: OTHER | Age: 39
End: 2024-05-30

## 2024-05-30 DIAGNOSIS — K76.0 HEPATIC STEATOSIS: Primary | ICD-10-CM

## 2024-05-30 DIAGNOSIS — E83.00 LOW CERULOPLASMIN LEVEL (H): ICD-10-CM

## 2024-05-30 LAB
A1AT SERPL-MCNC: 112 MG/DL (ref 90–200)
ANA PAT SER IF-IMP: ABNORMAL
ANA SER QL IF: POSITIVE
ANA TITR SER IF: ABNORMAL {TITER}
CERULOPLASMIN SERPL-MCNC: 11 MG/DL (ref 20–60)

## 2024-05-30 NOTE — TELEPHONE ENCOUNTER
Result Notes     Janeth Tinoco NP  5/30/2024  9:54 AM CDT Back to Top      Notify patient.     Liver ultrasound shows some gallstones, but if he is not having pain, we do not do anything.     It also shows that his liver has a lot of fatty tissue. Because if this, I would like him to meet with the GI team at [facility]. If willing, please pend referral to Tl Clark at [facility] GI-diagnosis hepatic steatosis and low cercuplasmin        Janeth Tinoco NP            Patient agreeable to referral. Referral pended.

## 2024-05-31 ENCOUNTER — LAB (OUTPATIENT)
Dept: LAB | Facility: OTHER | Age: 39
End: 2024-05-31
Payer: COMMERCIAL

## 2024-05-31 DIAGNOSIS — R76.8 POSITIVE ANA (ANTINUCLEAR ANTIBODY): Primary | ICD-10-CM

## 2024-05-31 DIAGNOSIS — R76.8 POSITIVE ANA (ANTINUCLEAR ANTIBODY): ICD-10-CM

## 2024-05-31 LAB — SMA IGG SER-ACNC: 21 UNITS

## 2024-05-31 PROCEDURE — 86235 NUCLEAR ANTIGEN ANTIBODY: CPT | Mod: 91

## 2024-05-31 PROCEDURE — 82784 ASSAY IGA/IGD/IGG/IGM EACH: CPT

## 2024-05-31 PROCEDURE — 86160 COMPLEMENT ANTIGEN: CPT | Mod: 91

## 2024-05-31 PROCEDURE — 86200 CCP ANTIBODY: CPT

## 2024-05-31 PROCEDURE — 86235 NUCLEAR ANTIGEN ANTIBODY: CPT

## 2024-05-31 PROCEDURE — 86431 RHEUMATOID FACTOR QUANT: CPT

## 2024-05-31 PROCEDURE — 86225 DNA ANTIBODY NATIVE: CPT

## 2024-05-31 PROCEDURE — 86160 COMPLEMENT ANTIGEN: CPT

## 2024-05-31 PROCEDURE — 36415 COLL VENOUS BLD VENIPUNCTURE: CPT

## 2024-06-01 LAB — SMOOTH MUSCLE IGG TITR SER: ABNORMAL {TITER}

## 2024-06-02 LAB — RHEUMATOID FACT SERPL-ACNC: <10 IU/ML

## 2024-06-03 LAB
C3 SERPL-MCNC: 116 MG/DL (ref 81–157)
C4 SERPL-MCNC: 24 MG/DL (ref 13–39)
IGG SERPL-MCNC: 1172 MG/DL (ref 610–1616)
PATH REPORT.COMMENTS IMP SPEC: NORMAL
PATH REPORT.COMMENTS IMP SPEC: NORMAL
PATH REPORT.FINAL DX SPEC: NORMAL
PATH REPORT.MICROSCOPIC SPEC OTHER STN: NORMAL
PATH REPORT.MICROSCOPIC SPEC OTHER STN: NORMAL
PATH REPORT.RELEVANT HX SPEC: NORMAL

## 2024-06-04 LAB
CCP AB SER IA-ACNC: 0.7 U/ML
CENTROMERE B AB SER-ACNC: <0.7 U/ML
CENTROMERE B AB SER-ACNC: NEGATIVE
DSDNA AB SER-ACNC: 1.2 IU/ML
ENA SM IGG SER IA-ACNC: <0.7 U/ML
ENA SM IGG SER IA-ACNC: NEGATIVE
ENA SS-A AB SER IA-ACNC: <0.5 U/ML
ENA SS-A AB SER IA-ACNC: NEGATIVE
ENA SS-B IGG SER IA-ACNC: <0.6 U/ML
ENA SS-B IGG SER IA-ACNC: NEGATIVE

## 2024-06-05 ENCOUNTER — TELEPHONE (OUTPATIENT)
Dept: FAMILY MEDICINE | Facility: OTHER | Age: 39
End: 2024-06-05

## 2024-06-05 DIAGNOSIS — D64.9 ANEMIA, UNSPECIFIED TYPE: Primary | ICD-10-CM

## 2024-06-05 DIAGNOSIS — R79.9 ABNORMAL BLOOD SMEAR: ICD-10-CM

## 2024-06-11 ENCOUNTER — PREP FOR PROCEDURE (OUTPATIENT)
Dept: SURGERY | Facility: OTHER | Age: 39
End: 2024-06-11

## 2024-06-11 ENCOUNTER — OFFICE VISIT (OUTPATIENT)
Dept: SURGERY | Facility: OTHER | Age: 39
End: 2024-06-11
Attending: NURSE PRACTITIONER
Payer: COMMERCIAL

## 2024-06-11 VITALS
HEIGHT: 71 IN | SYSTOLIC BLOOD PRESSURE: 136 MMHG | HEART RATE: 61 BPM | RESPIRATION RATE: 16 BRPM | DIASTOLIC BLOOD PRESSURE: 76 MMHG | OXYGEN SATURATION: 97 % | WEIGHT: 220 LBS | BODY MASS INDEX: 30.8 KG/M2

## 2024-06-11 DIAGNOSIS — D64.9 ANEMIA, UNSPECIFIED TYPE: ICD-10-CM

## 2024-06-11 DIAGNOSIS — K21.9 GASTROESOPHAGEAL REFLUX DISEASE WITHOUT ESOPHAGITIS: Primary | ICD-10-CM

## 2024-06-11 DIAGNOSIS — D64.9 ANEMIA: ICD-10-CM

## 2024-06-11 DIAGNOSIS — K21.9 GERD (GASTROESOPHAGEAL REFLUX DISEASE): Primary | ICD-10-CM

## 2024-06-11 PROCEDURE — 99213 OFFICE O/P EST LOW 20 MIN: CPT | Performed by: NURSE PRACTITIONER

## 2024-06-11 RX ORDER — BISACODYL 5 MG/1
5 TABLET, DELAYED RELEASE ORAL DAILY PRN
Qty: 4 TABLET | Refills: 0 | Status: SHIPPED | OUTPATIENT
Start: 2024-06-11 | End: 2024-09-19

## 2024-06-11 ASSESSMENT — PAIN SCALES - GENERAL: PAINLEVEL: NO PAIN (0)

## 2024-06-11 NOTE — PATIENT INSTRUCTIONS
Thank you for allowing Deisy Conway CNP and our surgical team to participate in your care. Please call our health unit coordinator at [phone] with scheduling questions or the nurse at [phone] with any other questions or concerns.      You have been scheduled for:  Upper endoscopy and colonoscopy with  on 7/18/24.   You will use golytely bowel prep.  Please see handout for additional instruction.  You will not need a pre-operative appointment with your primary care provider.  You may call [phone] or [phone] with any questions.

## 2024-06-11 NOTE — PROGRESS NOTES
CLINIC NOTE - CONSULT  6/11/2024    Patient : Odilon Enriquez  Referring Physician :  Janeth Tinoco NP    Reason for Referral : Upper and lower endoscopy    This is a 39 year old male with a need for an upper and lower endoscopy.  Upper endoscopy is needed for Gastroesohpageal Reflux and anemia .  Lower endoscopy is needed for Anemia.      Last EGD : Never  History of GERD : YES  History of PUD : NO  On PPI's : Not Applicable    Last colonoscopy : Never  Family history of colon cancer : NO  Family history of colon polyps : NO  Personal history of colon cancer : NO  Personal history of colon polyps : NO  Rectal bleeding : NO  Changes in bowel habits :NO  Personal history of inflammatory bowel disease : NO    Past Medical History:  History reviewed. No pertinent past medical history.    Past Surgical History:  Past Surgical History:   Procedure Laterality Date    VASECTOMY         Family History History:  Family History   Problem Relation Age of Onset    Rheumatoid Arthritis Mother     Stomach Cancer Maternal Grandfather     Esophageal Cancer Maternal Grandfather        History of Tobacco Use:  History   Smoking Status    Former    Types: Cigarettes   Smokeless Tobacco    Current    Types: Chew       Current Medications:  Current Outpatient Medications   Medication Sig Dispense Refill    azelastine-fluticasone (DYMISTA) 137-50 MCG/ACT nasal spray Spray 1 spray into both nostrils 2 times daily 23 g 11    cetirizine (ZYRTEC) 10 MG tablet TAKE 2 TABLETS (20 MG) BY MOUTH 2 TIMES DAILY 180 tablet 0       Allergies:  Allergies   Allergen Reactions    Seasonal Allergies Itching     Sinus blockage       ROS:  Constitutional: negative  Eyes: negative  Ears, nose, mouth, throat, and face: positive for allergies  Respiratory: negative  Cardiovascular: negative  Gastrointestinal: positive for reflux symptoms and anemia  Genitourinary:negative  Integument/breast: negative  Hematologic/lymphatic: negative  Musculoskeletal: positive  "for aches and pains  Neurological: negative  Behavioral/Psych: positive for chewing tobacco, occasional vaping,   Endocrine: negative  Allergic/Immunologic: negative    PHYSICAL EXAM:     Vital signs: /76 (BP Location: Left arm, Cuff Size: Adult Regular)   Pulse 61   Resp 16   Ht 1.803 m (5' 11\")   Wt 99.8 kg (220 lb)   SpO2 97%   BMI 30.68 kg/m     BMI: Body mass index is 30.68 kg/m .   General: Normal, healthy, cooperative, in no acute distress, alert   Skin: no rashes   Lungs: clear to auscultation   CV: Regular rate and rhythm    Abdominal: non-distended, soft, non-tender to palpation   Extremities: No cyanosis, clubbing or edema noted bilaterally in Upper and Lower Extremities   Neurological: without deficit    Assessment:   39 year old male with need for upper endoscopy for Gastroesohpageal Reflux and anemia  and lower endoscopy for Anemia:    Plan:   Will schedule an esophagogastroduodenoscopy and colonoscopy.  The procedures with their risks, benefits and alternatives were explained.  Risks include but are not limited to bleeding, perforation, missing lesions, need for additional procedures, reaction to anesthesia.  All the patients questions were answered.  The patient consents to proceed.  The procedures will be scheduled.     "

## 2024-06-13 ENCOUNTER — TELEPHONE (OUTPATIENT)
Dept: FAMILY MEDICINE | Facility: OTHER | Age: 39
End: 2024-06-13

## 2024-06-13 NOTE — TELEPHONE ENCOUNTER
Multiple attempts to fax referral to [facility] referral dept along with calls. Fax still not rec'd or in system.  Faxed directly to urology [phone]/ phone: [phone].

## 2024-06-20 ENCOUNTER — ONCOLOGY VISIT (OUTPATIENT)
Dept: ONCOLOGY | Facility: OTHER | Age: 39
End: 2024-06-20
Attending: NURSE PRACTITIONER
Payer: COMMERCIAL

## 2024-06-20 VITALS
OXYGEN SATURATION: 97 % | HEIGHT: 71 IN | HEART RATE: 83 BPM | WEIGHT: 218.03 LBS | DIASTOLIC BLOOD PRESSURE: 68 MMHG | TEMPERATURE: 98 F | BODY MASS INDEX: 30.52 KG/M2 | SYSTOLIC BLOOD PRESSURE: 124 MMHG

## 2024-06-20 DIAGNOSIS — R79.9 ABNORMAL BLOOD SMEAR: ICD-10-CM

## 2024-06-20 DIAGNOSIS — D64.9 ANEMIA, UNSPECIFIED TYPE: ICD-10-CM

## 2024-06-20 LAB
BASOPHILS # BLD AUTO: 0.1 10E3/UL (ref 0–0.2)
BASOPHILS NFR BLD AUTO: 1 %
EOSINOPHIL # BLD AUTO: 0.2 10E3/UL (ref 0–0.7)
EOSINOPHIL NFR BLD AUTO: 3 %
ERYTHROCYTE [DISTWIDTH] IN BLOOD BY AUTOMATED COUNT: 14.4 % (ref 10–15)
HCT VFR BLD AUTO: 38.9 % (ref 40–53)
HGB BLD-MCNC: 12.3 G/DL (ref 13.3–17.7)
IMM GRANULOCYTES # BLD: 0 10E3/UL
IMM GRANULOCYTES NFR BLD: 1 %
LAB DIRECTOR COMMENTS: NORMAL
LAB DIRECTOR DISCLAIMER: NORMAL
LAB DIRECTOR INTERPRETATION: NORMAL
LAB DIRECTOR METHODOLOGY: NORMAL
LAB DIRECTOR RESULTS: NORMAL
LDH SERPL L TO P-CCNC: 148 U/L (ref 0–250)
LYMPHOCYTES # BLD AUTO: 2 10E3/UL (ref 0.8–5.3)
LYMPHOCYTES NFR BLD AUTO: 39 %
MCH RBC QN AUTO: 20.8 PG (ref 26.5–33)
MCHC RBC AUTO-ENTMCNC: 31.6 G/DL (ref 31.5–36.5)
MCV RBC AUTO: 66 FL (ref 78–100)
MONOCYTES # BLD AUTO: 0.4 10E3/UL (ref 0–1.3)
MONOCYTES NFR BLD AUTO: 8 %
NEUTROPHILS # BLD AUTO: 2.5 10E3/UL (ref 1.6–8.3)
NEUTROPHILS NFR BLD AUTO: 48 %
NRBC # BLD AUTO: 0 10E3/UL
NRBC BLD AUTO-RTO: 0 /100
PLATELET # BLD AUTO: 287 10E3/UL (ref 150–450)
RBC # BLD AUTO: 5.91 10E6/UL (ref 4.4–5.9)
RETICS # AUTO: 0.09 10E6/UL (ref 0.03–0.1)
RETICS/RBC NFR AUTO: 1.6 % (ref 0.5–2)
SPECIMEN DESCRIPTION: NORMAL
WBC # BLD AUTO: 5.1 10E3/UL (ref 4–11)

## 2024-06-20 PROCEDURE — 81256 HFE GENE: CPT | Performed by: INTERNAL MEDICINE

## 2024-06-20 PROCEDURE — 36415 COLL VENOUS BLD VENIPUNCTURE: CPT | Performed by: INTERNAL MEDICINE

## 2024-06-20 PROCEDURE — 85660 RBC SICKLE CELL TEST: CPT | Mod: 90 | Performed by: INTERNAL MEDICINE

## 2024-06-20 PROCEDURE — 85025 COMPLETE CBC W/AUTO DIFF WBC: CPT | Performed by: INTERNAL MEDICINE

## 2024-06-20 PROCEDURE — G0452 MOLECULAR PATHOLOGY INTERPR: HCPCS | Performed by: PATHOLOGY

## 2024-06-20 PROCEDURE — 83020 HEMOGLOBIN ELECTROPHORESIS: CPT | Mod: 90 | Performed by: INTERNAL MEDICINE

## 2024-06-20 PROCEDURE — 83615 LACTATE (LD) (LDH) ENZYME: CPT | Performed by: INTERNAL MEDICINE

## 2024-06-20 PROCEDURE — 99205 OFFICE O/P NEW HI 60 MIN: CPT | Performed by: INTERNAL MEDICINE

## 2024-06-20 PROCEDURE — 85045 AUTOMATED RETICULOCYTE COUNT: CPT | Performed by: INTERNAL MEDICINE

## 2024-06-20 PROCEDURE — 83021 HEMOGLOBIN CHROMOTOGRAPHY: CPT | Mod: 90 | Performed by: INTERNAL MEDICINE

## 2024-06-20 PROCEDURE — 83010 ASSAY OF HAPTOGLOBIN QUANT: CPT | Performed by: INTERNAL MEDICINE

## 2024-06-20 ASSESSMENT — PAIN SCALES - GENERAL: PAINLEVEL: NO PAIN (0)

## 2024-06-20 ASSESSMENT — PATIENT HEALTH QUESTIONNAIRE - PHQ9: SUM OF ALL RESPONSES TO PHQ QUESTIONS 1-9: 4

## 2024-06-20 NOTE — NURSING NOTE
"Oncology Rooming Note    June 20, 2024 1:46 PM   Odilon Enriquez is a 39 year old male who presents for:    Chief Complaint   Patient presents with    Hematology     New consult. Anemia, Abnormal blood smear     Initial Vitals: /68 (BP Location: Right arm, Patient Position: Sitting, Cuff Size: Adult Large)   Pulse 83   Temp 98  F (36.7  C) (Tympanic)   Ht 1.803 m (5' 11\")   Wt 98.9 kg (218 lb 0.6 oz)   SpO2 97%   BMI 30.41 kg/m   Estimated body mass index is 30.41 kg/m  as calculated from the following:    Height as of this encounter: 1.803 m (5' 11\").    Weight as of this encounter: 98.9 kg (218 lb 0.6 oz). Body surface area is 2.23 meters squared.  No Pain (0) Comment: Data Unavailable   No LMP for male patient.  Allergies reviewed: Yes  Medications reviewed: Yes    Medications: Medication refills not needed today.  Pharmacy name entered into [facility]:    [id number] - [place], [place] - [address]  AT [address]  [id number] - [place], [place] - [address]    Frailty Screening:   Is the patient here for a new oncology consult visit in cancer care? 2. No      Clinical concerns: none       Courtney Hill LPN              "

## 2024-06-20 NOTE — PROGRESS NOTES
HEMATOLOGY CONSULT NOTE  Jun 20, 2024    Reason for consult: Anemia.     HISTORY OF PRESENT ILLNESS  Odilon Enriquez is a 39 year old male with PMH as stated below who is seen in the hematology clinic for anemia.    His history in short is as follows:    Mr Enriquez was having chronic fatigue for a few years.  He was found to have low testosterone and therefore was evaluated by his primary care, Ms. Tinoco     Workup done at that time showed a hemoglobin of 13 on 5/24/2024.  He was also found to have an MCV of 65.  Other workup done showed an elevated AST at 47 and ALT at 127.  Ferritin was also elevated at 525 with iron saturation of 44.    He states he was told around 10 years that he is iron deficient.  He is currently not on iron supplementation.  No prior history of transfusion.    No family history of thalassemia.  Has [place] ancestry.  He recently underwent an ultrasound abdomen for the elevated liver enzymes.  Found to have gallstones.     REVIEW OF SYSTEMS    A 12-point ROS negative except as in HPI      Current Outpatient Medications   Medication Sig Dispense Refill    cetirizine (ZYRTEC) 10 MG tablet TAKE 2 TABLETS (20 MG) BY MOUTH 2 TIMES DAILY 180 tablet 0    azelastine-fluticasone (DYMISTA) 137-50 MCG/ACT nasal spray Spray 1 spray into both nostrils 2 times daily (Patient not taking: Reported on 6/20/2024) 23 g 11    bisacodyl (DULCOLAX) 5 MG EC tablet Take 1 tablet (5 mg) by mouth daily as needed for constipation Take 2 tablets by mouth 2 days prior to procedure. Take the remaining 2 tablets by mouth at 3pm the day prior to procedure. (Patient not taking: Reported on 6/20/2024) 4 tablet 0       Allergies   Allergen Reactions    Seasonal Allergies Itching     Sinus blockage     Immunization History   Administered Date(s) Administered    Flu, Unspecified 11/28/2016    [id number] Novel Flu- Nasal 11/12/2009    Influenza (IIV3) PF 01/11/2013    Influenza Vaccine >6 months,quad, PF 10/07/2014    Influenza  "Vaccine, 6+MO IM (QUADRIVALENT W/PRESERVATIVES) 11/28/2016    TDAP (Adacel,Boostrix) 03/31/2008    TDAP Vaccine (Adacel) 08/20/2019    Tdap (Adacel,boostrix) 03/31/2008       History reviewed. No pertinent past medical history.    Past Surgical History:   Procedure Laterality Date    VASECTOMY         SOCIAL HISTORY  History   Smoking Status    Former    Types: Cigarettes   Smokeless Tobacco    Current    Types: Chew    Social History    Substance and Sexual Activity      Alcohol use: Yes        Comment: every weekend     History   Drug Use Unknown       FAMILY HISTORY  Family History   Problem Relation Age of Onset    Rheumatoid Arthritis Mother     Stomach Cancer Maternal Grandfather     Esophageal Cancer Maternal Grandfather        PHYSICAL EXAMINATION  /68 (BP Location: Right arm, Patient Position: Sitting, Cuff Size: Adult Large)   Pulse 83   Temp 98  F (36.7  C) (Tympanic)   Ht 1.803 m (5' 11\")   Wt 98.9 kg (218 lb 0.6 oz)   SpO2 97%   BMI 30.41 kg/m    Wt Readings from Last 2 Encounters:   06/20/24 98.9 kg (218 lb 0.6 oz)   06/11/24 99.8 kg (220 lb)     Physical Exam  Constitutional:       Appearance: Normal appearance.   Pulmonary:      Effort: Pulmonary effort is normal.   Neurological:      General: No focal deficit present.      Mental Status: He is alert and oriented to person, place, and time.     Laboratory and imaging:   Latest Reference Range & Units 06/20/24 14:42   WBC 4.0 - 11.0 10e3/uL 5.1   Hemoglobin 13.3 - 17.7 g/dL 12.3 (L)   Hematocrit 40.0 - 53.0 % 38.9 (L)   Platelet Count 150 - 450 10e3/uL 287   (L): Data is abnormally low    5/29/2024: Peripheral smear:Mild hypochromic, markedly microcytic anemia without evidence of red cell regeneration     ASSESSMENT AND PLAN    1.  Microcytic anemia:    Anemia with microcytosis seen on labs done 5/24/2024.  No prior lab seen for comparison.  Was told as a child that he has iron deficiency.  Other findings show an elevated RBC count.  He " was also found to have elevated ferritin of 5.5 with iron saturation of 44.  Again no other labs for comparison.    Given that microcytic anemia seen elevated RBC count this is concerning for thalassemia.  Overall this is very likely thalassemia minor as he has not had any history of transfusion.  He does have gallstones ultrasound abdomen.    Will send a hemoglobin electrophoresis.  If the hemoglobin electrophoresis is concerning for thalassemia will also require referral to genetics.  I will also recommend testing for hemochromatosis mutation panel given the elevated ferritin.  Workup done shows a vitamin B12 level and folic acid within normal limits.    2.  Elevated ferritin::    Found to have elevated liver enzymes and elevated ferritin.  Iron saturation is not elevated.  Concern for iron overload is low however there is concern for ischemia and is important to delineate if this elevated ferritin is from possible thalassemia or does he have any liver injury.  He is seeing GI next month.    Again the ferritin is not significantly elevated to be the cause of the elevated liver enzymes however it is important we continue to monitor.    At this time I would recommend he does not take any exogenous iron.  Limit alcohol and red meat.    Will follow-up again in 3 months with iron panel and ferritin.    Total time spent on the patient on day of encounter was 60 minutes doing chart review, review of test results, interpretation of results, patient visit and documentation.         Capri Chua MD

## 2024-06-21 LAB — HAPTOGLOB SERPL-MCNC: 87 MG/DL (ref 30–200)

## 2024-06-24 LAB
HGB A1 MFR BLD: 94.1 %
HGB A2 MFR BLD: 5.1 %
HGB C MFR BLD: 0 %
HGB E MFR BLD: 0 %
HGB F MFR BLD: 0.8 %
HGB FRACT BLD ELPH-IMP: ABNORMAL
HGB OTHER MFR BLD: 0 %
HGB S BLD QL SOLY: ABNORMAL
HGB S MFR BLD: 0 %
PATH INTERP BLD-IMP: ABNORMAL

## 2024-06-27 DIAGNOSIS — D64.9 ANEMIA, UNSPECIFIED TYPE: Primary | ICD-10-CM

## 2024-06-27 NOTE — RESULT ENCOUNTER NOTE
Hemoglobin electrophoresis showed elevated A2 without an elevated hemoglobin F. This can be from Beta thalassemia. I would recommend a referral to genetics for further work up. I will place the referral and they will call him.

## 2024-07-07 ENCOUNTER — HEALTH MAINTENANCE LETTER (OUTPATIENT)
Age: 39
End: 2024-07-07

## 2024-07-09 ENCOUNTER — ANESTHESIA EVENT (OUTPATIENT)
Dept: SURGERY | Facility: HOSPITAL | Age: 39
End: 2024-07-09
Payer: COMMERCIAL

## 2024-07-09 ASSESSMENT — LIFESTYLE VARIABLES: TOBACCO_USE: 1

## 2024-07-09 NOTE — ANESTHESIA PREPROCEDURE EVALUATION
Anesthesia Pre-Procedure Evaluation    Patient: Odilon Enriquez   MRN: [id number] : 1985        Procedure : Procedure(s):  Upper endoscopy and colonoscopy          No past medical history on file.   Past Surgical History:   Procedure Laterality Date     VASECTOMY        Allergies   Allergen Reactions     Seasonal Allergies Itching     Sinus blockage      Social History     Tobacco Use     Smoking status: Former     Types: Cigarettes     Start date: 1997     Smokeless tobacco: Current     Types: Chew   Substance Use Topics     Alcohol use: Yes     Comment: every weekend      Wt Readings from Last 1 Encounters:   24 98.9 kg (218 lb 0.6 oz)        Anesthesia Evaluation   Pt has had prior anesthetic.     No history of anesthetic complications       ROS/MED HX  ENT/Pulmonary:     (+)                tobacco use (chewing tobacco, occasional vaping), Current use,                       Neurologic:  - neg neurologic ROS     Cardiovascular:       METS/Exercise Tolerance:     Hematologic: Comments: Saw hematology 24  Per 24 note: Hemoglobin electrophoresis showed elevated A2 without an elevated hemoglobin F. This can be from Beta thalassemia. I would recommend a referral to genetics for further work up.     (+)      anemia,          Musculoskeletal:  - neg musculoskeletal ROS     GI/Hepatic:     (+) GERD, Asymptomatic on medication,      bowel prep,  cholecystitis/cholelithiasis (24 US: Cholelithiasis without biliary ductal enlargement),          Renal/Genitourinary:  - neg Renal ROS     Endo:     (+)               Obesity,       Psychiatric/Substance Use: Comment: 24: admitted to drinking large amounts of alcohol      Infectious Disease:  - neg infectious disease ROS     Malignancy:  - neg malignancy ROS     Other:  - neg other ROS          Physical Exam    Airway  airway exam normal      Mallampati: II   TM distance: > 3 FB   Neck ROM: full   Mouth opening: > 3 cm    Respiratory Devices and  "Support         Dental       (+) Minor Abnormalities - some fillings, tiny chips      Cardiovascular   cardiovascular exam normal       Rhythm and rate: regular and normal     Pulmonary   pulmonary exam normal        breath sounds clear to auscultation       OUTSIDE LABS:  CBC:   Lab Results   Component Value Date    WBC 5.1 06/20/2024    WBC 4.4 05/29/2024    HGB 12.3 (L) 06/20/2024    HGB 12.5 (L) 05/29/2024    HCT 38.9 (L) 06/20/2024    HCT 39.7 (L) 05/29/2024     06/20/2024     05/29/2024     BMP:   Lab Results   Component Value Date     05/24/2024     08/22/2022    POTASSIUM 4.2 05/24/2024    POTASSIUM 3.8 08/22/2022    CHLORIDE 103 05/24/2024    CHLORIDE 103 08/22/2022    CO2 27 05/24/2024    CO2 29 08/22/2022    BUN 8.5 05/24/2024    BUN 13 08/22/2022    CR 0.95 05/24/2024    CR 0.94 08/22/2022    GLC 99 05/24/2024     (H) 08/22/2022     COAGS:   Lab Results   Component Value Date    INR 0.92 05/24/2024     POC: No results found for: \"BGM\", \"HCG\", \"HCGS\"  HEPATIC:   Lab Results   Component Value Date    ALBUMIN 4.7 05/24/2024    PROTTOTAL 7.7 05/24/2024     (H) 05/24/2024    AST 47 (H) 05/24/2024    GGT 51 05/24/2024    ALKPHOS 66 05/24/2024    BILITOTAL 1.1 05/24/2024     OTHER:   Lab Results   Component Value Date    BERTA 9.3 05/24/2024       Anesthesia Plan    ASA Status:  2    NPO Status:  NPO Appropriate    Anesthesia Type: MAC.     - Reason for MAC: straight local not clinically adequate              Consents    Anesthesia Plan(s) and associated risks, benefits, and realistic alternatives discussed. Questions answered and patient/representative(s) expressed understanding.     - Discussed: Risks, Benefits and Alternatives for BOTH SEDATION and the PROCEDURE were discussed     - Discussed with:  Patient      - Extended Intubation/Ventilatory Support Discussed: No.      - Patient is DNR/DNI Status: No     Use of blood products discussed: No .     Postoperative " "Care            Comments:    Other Comments: Man 6/11 - same day surgical HP           Nellie Mackey, LAST CNP    I have reviewed the pertinent notes and labs in the chart from the past 30 days and (re)examined the patient.  Any updates or changes from those notes are reflected in this note.              # Obesity: Estimated body mass index is 30.41 kg/m  as calculated from the following:    Height as of 6/20/24: 1.803 m (5' 11\").    Weight as of 6/20/24: 98.9 kg (218 lb 0.6 oz).      "

## 2024-07-16 NOTE — DISCHARGE INSTRUCTIONS
Upper GI Endoscopy: What to Expect at Home  Your Recovery  You had an upper GI endoscopy. Your doctor used a thin, lighted tube that bends to look at the inside of your esophagus, your stomach, and the first part of the small intestine, called the duodenum.  After you have an endoscopy, you will stay at the hospital or clinic for 1 to 2 hours. This will allow the medicine to wear off. You will be able to go home after your doctor or nurse checks to make sure that you're not having any problems.  You may have to stay overnight if you had treatment during the test. You may have a sore throat for a day or two after the test.  This care sheet gives you a general idea about what to expect after the test.  How can you care for yourself at home?  Activity   Rest as much as you need to after you go home.  You should be able to go back to your usual activities the day after the test.  Diet   Follow your doctor's directions for eating after the test.  Drink plenty of fluids (unless your doctor has told you not to).  Medications   If you have a sore throat the day after the test, use an over-the-counter spray to numb your throat.  Follow-up care is a key part of your treatment and safety. Be sure to make and go to all appointments, and call your doctor if you are having problems. It's also a good idea to know your test results and keep a list of the medicines you take.  When should you call for help?   Call 911 anytime you think you may need emergency care. For example, call if:    You passed out (lost consciousness).     You have trouble breathing.     You pass maroon or bloody stools.   Call your doctor now or seek immediate medical care if:    You have pain that does not get better after your take pain medicine.     You have new or worse belly pain.     You have blood in your stools.     You are sick to your stomach and cannot keep fluids down.     You have a fever.     You cannot pass stools or gas.   Watch closely for  "changes in your health, and be sure to contact your doctor if:    Your throat still hurts after a day or two.     You do not get better as expected.   Where can you learn more?  Go to https://www.[employer].net/patiented  Enter J454 in the search box to learn more about \"Upper GI Endoscopy: What to Expect at Home.\"  Current as of: October 19, 2023               Content Version: 14.0    6865-2934 [employer].   Care instructions adapted under license by your healthcare professional. If you have questions about a medical condition or this instruction, always ask your healthcare professional. [employer] disclaims any warranty or liability for your use of this information.    Colonoscopy: What to Expect at Home  Your Recovery  After a colonoscopy, you'll stay at the clinic until you wake up. Then you can go home. But you'll need to arrange for a ride. Your doctor will tell you when you can eat and do your other usual activities.  Your doctor will talk to you about when you'll need your next colonoscopy. Your doctor can help you decide how often you need to be checked. This will depend on the results of your test and your risk for colorectal cancer.  After the test, you may be bloated or have gas pains. You may need to pass gas. If a biopsy was done or a polyp was removed, you may have streaks of blood in your stool (feces) for a few days. Problems such as heavy rectal bleeding may not occur until several weeks after the test. This isn't common. But it can happen after polyps are removed.  This care sheet gives you a general idea about how long it will take for you to recover. But each person recovers at a different pace. Follow the steps below to get better as quickly as possible.  How can you care for yourself at home?  Activity    Rest when you feel tired.     You can do your normal activities when it feels okay to do so.   Diet    Follow your doctor's directions for eating.     Unless your " doctor has told you not to, drink plenty of fluids. This helps to replace the fluids that were lost during the colon prep.     Do not drink alcohol.   Medicines    Your doctor will tell you if and when you can restart your medicines. You will also be given instructions about taking any new medicines.     If you stopped taking aspirin or some other blood thinner, your doctor will tell you when to start taking it again.     If polyps were removed or a biopsy was done during the test, your doctor may tell you not to take aspirin or other anti-inflammatory medicines for a few days. These include ibuprofen (Advil, Motrin) and naproxen (Aleve).   Other instructions    For your safety, do not drive or operate machinery until the medicine wears off and you can think clearly. Your doctor may tell you not to drive or operate machinery until the day after your test.     Do not sign legal documents or make major decisions until the medicine wears off and you can think clearly. The anesthesia can make it hard for you to fully understand what you are agreeing to.   Follow-up care is a key part of your treatment and safety. Be sure to make and go to all appointments, and call your doctor if you are having problems. It's also a good idea to know your test results and keep a list of the medicines you take.  When should you call for help?   Call 911 anytime you think you may need emergency care. For example, call if:    You passed out (lost consciousness).     You pass maroon or bloody stools.     You have trouble breathing.   Call your doctor now or seek immediate medical care if:    You have pain that does not get better after you take pain medicine.     You are sick to your stomach or cannot drink fluids.     You have new or worse belly pain.     You have blood in your stools.     You have a fever.     You cannot pass stools or gas.   Watch closely for changes in your health, and be sure to contact your doctor if you have any  "problems.  Where can you learn more?  Go to https://www.[employer].net/patiented  Enter E264 in the search box to learn more about \"Colonoscopy: What to Expect at Home.\"  Current as of: October 25, 2023               Content Version: 14.0    5689-0216 [employer].   Care instructions adapted under license by your healthcare professional. If you have questions about a medical condition or this instruction, always ask your healthcare professional. [employer] disclaims any warranty or liability for your use of this information.    After Anesthesia (Sleep Medicine)  What should I do after anesthesia?  You should rest and relax for the next 24 hours. Avoid risky or difficult (strenuous) activity. A responsible adult should stay with you overnight.  Don't drive or use any heavy equipment for 24 hours. Even if you feel normal, your reactions may be affected by the sleep medicine given to you.  Don't drink alcohol or make any important decisions for 24 hours.  Slowly get back to your regular diet, as you feel able.  How should I expect to feel?  It's normal to feel dizzy, light-headed, or faint for up to a full day after anesthesia or while taking pain medicine. If this happens:   Sit down for a few minutes before standing.  Have someone help you when you get up to walk or use the bathroom.  If you have nausea (feel sick to your stomach) or vomit (throw up):   Drink clear liquids (such as apple juice, ginger ale, broth, or 7UP) until you feel better.  If you feel sick to your stomach, or you keep vomiting for 24 hours, please call the doctor.  What else should I know?  You might have a dry mouth, sore throat, muscle aches, or trouble sleeping. These should go away after 24 hours.  Please contact your doctor if you have any other symptoms that concern you, such as fever, pain, bleeding, fluid drainage, swelling, or headache, or if it's been over 8 to 10 hours and you still aren't able to pee " (urinate).  If you have a history of sleep apnea, it's very important to use your CPAP machine for the next 24 hours when you nap or sleep.   For informational purposes only. Not to replace the advice of your health care provider. Copyright   2023 [facility]. All rights reserved. Clinically reviewed by Gaetano oLmbardi MD. [employer] [id number] - REV 09/23.

## 2024-07-18 ENCOUNTER — TELEPHONE (OUTPATIENT)
Dept: CONSULT | Facility: CLINIC | Age: 39
End: 2024-07-18
Payer: COMMERCIAL

## 2024-07-18 ENCOUNTER — ANESTHESIA (OUTPATIENT)
Dept: SURGERY | Facility: HOSPITAL | Age: 39
End: 2024-07-18
Payer: COMMERCIAL

## 2024-07-18 ENCOUNTER — HOSPITAL ENCOUNTER (OUTPATIENT)
Facility: HOSPITAL | Age: 39
Discharge: HOME | End: 2024-07-18
Attending: SURGERY | Admitting: SURGERY
Payer: COMMERCIAL

## 2024-07-18 VITALS
SYSTOLIC BLOOD PRESSURE: 109 MMHG | OXYGEN SATURATION: 95 % | RESPIRATION RATE: 16 BRPM | DIASTOLIC BLOOD PRESSURE: 69 MMHG | HEART RATE: 55 BPM | WEIGHT: 212.6 LBS | HEIGHT: 71 IN | BODY MASS INDEX: 29.76 KG/M2 | TEMPERATURE: 97.3 F

## 2024-07-18 PROCEDURE — 250N000009 HC RX 250: Performed by: NURSE ANESTHETIST, CERTIFIED REGISTERED

## 2024-07-18 PROCEDURE — 272N000001 HC OR GENERAL SUPPLY STERILE: Performed by: SURGERY

## 2024-07-18 PROCEDURE — 88305 TISSUE EXAM BY PATHOLOGIST: CPT | Mod: 26 | Performed by: PATHOLOGY

## 2024-07-18 PROCEDURE — 370N000017 HC ANESTHESIA TECHNICAL FEE, PER MIN: Performed by: SURGERY

## 2024-07-18 PROCEDURE — 43239 EGD BIOPSY SINGLE/MULTIPLE: CPT | Performed by: SURGERY

## 2024-07-18 PROCEDURE — 45378 DIAGNOSTIC COLONOSCOPY: CPT | Performed by: SURGERY

## 2024-07-18 PROCEDURE — 250N000011 HC RX IP 250 OP 636: Performed by: NURSE ANESTHETIST, CERTIFIED REGISTERED

## 2024-07-18 PROCEDURE — 258N000003 HC RX IP 258 OP 636: Performed by: NURSE PRACTITIONER

## 2024-07-18 PROCEDURE — 710N000012 HC RECOVERY PHASE 2, PER MINUTE: Performed by: SURGERY

## 2024-07-18 PROCEDURE — 43239 EGD BIOPSY SINGLE/MULTIPLE: CPT | Performed by: NURSE ANESTHETIST, CERTIFIED REGISTERED

## 2024-07-18 PROCEDURE — 360N000075 HC SURGERY LEVEL 2, PER MIN: Performed by: SURGERY

## 2024-07-18 PROCEDURE — 999N000141 HC STATISTIC PRE-PROCEDURE NURSING ASSESSMENT: Performed by: SURGERY

## 2024-07-18 PROCEDURE — 88305 TISSUE EXAM BY PATHOLOGIST: CPT | Mod: TC | Performed by: SURGERY

## 2024-07-18 RX ORDER — PROPOFOL 10 MG/ML
INJECTION, EMULSION INTRAVENOUS PRN
Status: DISCONTINUED | OUTPATIENT
Start: 2024-07-18 | End: 2024-07-18

## 2024-07-18 RX ORDER — ONDANSETRON 4 MG/1
4 TABLET, ORALLY DISINTEGRATING ORAL EVERY 30 MIN PRN
Status: DISCONTINUED | OUTPATIENT
Start: 2024-07-18 | End: 2024-07-18 | Stop reason: HOSPADM

## 2024-07-18 RX ORDER — OXYCODONE HYDROCHLORIDE 5 MG/1
5 TABLET ORAL
Status: DISCONTINUED | OUTPATIENT
Start: 2024-07-18 | End: 2024-07-18 | Stop reason: HOSPADM

## 2024-07-18 RX ORDER — NALOXONE HYDROCHLORIDE 0.4 MG/ML
0.1 INJECTION, SOLUTION INTRAMUSCULAR; INTRAVENOUS; SUBCUTANEOUS
Status: DISCONTINUED | OUTPATIENT
Start: 2024-07-18 | End: 2024-07-18 | Stop reason: HOSPADM

## 2024-07-18 RX ORDER — ONDANSETRON 2 MG/ML
4 INJECTION INTRAMUSCULAR; INTRAVENOUS EVERY 30 MIN PRN
Status: DISCONTINUED | OUTPATIENT
Start: 2024-07-18 | End: 2024-07-18 | Stop reason: HOSPADM

## 2024-07-18 RX ORDER — LIDOCAINE 40 MG/G
CREAM TOPICAL
Status: DISCONTINUED | OUTPATIENT
Start: 2024-07-18 | End: 2024-07-18 | Stop reason: HOSPADM

## 2024-07-18 RX ORDER — SODIUM CHLORIDE, SODIUM LACTATE, POTASSIUM CHLORIDE, CALCIUM CHLORIDE 600; 310; 30; 20 MG/100ML; MG/100ML; MG/100ML; MG/100ML
INJECTION, SOLUTION INTRAVENOUS CONTINUOUS
Status: DISCONTINUED | OUTPATIENT
Start: 2024-07-18 | End: 2024-07-18 | Stop reason: HOSPADM

## 2024-07-18 RX ORDER — LIDOCAINE HYDROCHLORIDE 20 MG/ML
INJECTION, SOLUTION INFILTRATION; PERINEURAL PRN
Status: DISCONTINUED | OUTPATIENT
Start: 2024-07-18 | End: 2024-07-18

## 2024-07-18 RX ORDER — DEXAMETHASONE SODIUM PHOSPHATE 10 MG/ML
4 INJECTION, SOLUTION INTRAMUSCULAR; INTRAVENOUS
Status: DISCONTINUED | OUTPATIENT
Start: 2024-07-18 | End: 2024-07-18 | Stop reason: HOSPADM

## 2024-07-18 RX ADMIN — SODIUM CHLORIDE, POTASSIUM CHLORIDE, SODIUM LACTATE AND CALCIUM CHLORIDE: 600; 310; 30; 20 INJECTION, SOLUTION INTRAVENOUS at 07:21

## 2024-07-18 RX ADMIN — PROPOFOL 50 MG: 10 INJECTION, EMULSION INTRAVENOUS at 08:44

## 2024-07-18 RX ADMIN — PROPOFOL 50 MG: 10 INJECTION, EMULSION INTRAVENOUS at 08:50

## 2024-07-18 RX ADMIN — PROPOFOL 50 MG: 10 INJECTION, EMULSION INTRAVENOUS at 08:42

## 2024-07-18 RX ADMIN — PROPOFOL 50 MG: 10 INJECTION, EMULSION INTRAVENOUS at 08:46

## 2024-07-18 RX ADMIN — PROPOFOL 50 MG: 10 INJECTION, EMULSION INTRAVENOUS at 08:39

## 2024-07-18 RX ADMIN — PROPOFOL 50 MG: 10 INJECTION, EMULSION INTRAVENOUS at 09:01

## 2024-07-18 RX ADMIN — PROPOFOL 50 MG: 10 INJECTION, EMULSION INTRAVENOUS at 08:54

## 2024-07-18 RX ADMIN — PROPOFOL 100 MG: 10 INJECTION, EMULSION INTRAVENOUS at 08:36

## 2024-07-18 RX ADMIN — PROPOFOL 50 MG: 10 INJECTION, EMULSION INTRAVENOUS at 08:57

## 2024-07-18 RX ADMIN — LIDOCAINE HYDROCHLORIDE 100 MG: 20 INJECTION, SOLUTION INFILTRATION; PERINEURAL at 08:36

## 2024-07-18 ASSESSMENT — ACTIVITIES OF DAILY LIVING (ADL)
ADLS_ACUITY_SCORE: 35
ADLS_ACUITY_SCORE: 33
ADLS_ACUITY_SCORE: 35

## 2024-07-18 NOTE — OP NOTE
REPORT OF OPERATION  DATE OF PROCEDURE: 7/18/2024    PATIENT: Odilon Enriquez    SURGERY PERFORMED: Esophagogastroduodenoscopy with biopsies and colonoscopy     PREOPERATIVE DIAGNOSIS:   Abdominal Pain and Gastroesohpageal Reflux   Anemia    POSTOPERATIVE DIAGNOSIS:    Minimal gastritis otherwise normal Esophagogastroduodenoscopy   Squamous columnar junction at 40   Normal colonoscopy   Diverticulosis was not identified.   Hemorrhoids  were  identified.    SURGEON: Patrick Kumar MD    ASSISTANTS: None    ANESTHESIA: Monitored Anesthesia Care    COMPLICATIONS: None apparent    TRANSFUSIONS: None    TISSUE TO PATHOLOGY: Duodenal, Antral, and Distal Esophageal    FINDINGS:   Gastritis, without bleeding   Normal colonoscopy   Diverticulosis was not identified.   Hemorrhoids  were  identified.    INDICATIONS: This is a 39 year old male in need of an Esophagogastroduodenoscopy for Abdominal Pain and Gastroesohpageal Reflux and a colonoscopy for Anemia.  The patient will be taken to the endoscopy suite for those procedures.    DESCRIPTIONS OF PROCEDURE IN DETAIL: After consent was obtained the patient was taken to the operative suite and keyanna in the left lateral decubitus position.  The patient was identified and the correct patient was confirmed. Monitored Anesthesia Care was given by anesthesia. A time out was performed verifying the correct patient and the correct procedure.  The entire operative team was in agreement.  All necessary equipment and supplies were in the room.    The endoscope was inserted into the mouth and passed without difficulty to the third portion of the duodenum.  Duodenal biopsies were taken.  The endoscope was then withdrawn through the duodenum, the duodenal bulb and pyloric channel and no abnormalities were noted.  The endoscope was brought back into the stomach and antral biopsies were obtained.  Minimal antritis was noted.  No bleeding was noted.  The endoscope was then retroflexed and no  other lesions of the fundus body or antrum were seen.  The endoscope was straightened back out and brought into the distal esophagus and a well-defined squamocolumnar junction was identified at 40 cm. Biopsies of the distal esophagus were taken.  The endoscope was slowly withdrawn through the remaining esophagus no other abnormalities are seen,  The endoscope was withdrawn from the patient and the patient was positioned for colonoscopy.    Rectal exam was performed and no lesions of the anal canal were noted.  The colonoscope was inserted into the anus and passed without difficulty to the cecum.  The cecum was identified by the ileocecal valve, the coalescence of the tinea and the appendiceal orifice.  Upon withdrawal all walls of the colon were visualized.  There were no polyps, masses or evidence of colitis seen.  Diverticulosis was not seen.  Upon reaching the rectum the scope was retroflexed and internal hemorrhoids  were  seen.  The scope was straightened back out and removed from the patient.  The patient was then taken to the recovery room in stable condition tolerating the procedure well.      Prep: fair    Withdrawal time was 6 minutes.    It is recommended that the patient have another colonoscopy in 10 years.

## 2024-07-18 NOTE — ANESTHESIA POSTPROCEDURE EVALUATION
Patient: Odilon Enriquez    Procedure: Procedure(s):  Upper endoscopy with biopsy and colonoscopy       Anesthesia Type:  MAC    Note:  Disposition: Outpatient   Postop Pain Control: Uneventful            Sign Out: Well controlled pain   PONV: No   Neuro/Psych: Uneventful            Sign Out: Acceptable/Baseline neuro status   Airway/Respiratory: Uneventful            Sign Out: Acceptable/Baseline resp. status   CV/Hemodynamics: Uneventful            Sign Out: Acceptable CV status; No obvious hypovolemia; No obvious fluid overload   Other NRE: NONE   DID A NON-ROUTINE EVENT OCCUR? No         Last vitals:  Vitals Value Taken Time   /69 07/18/24 0952   Temp 97.3  F (36.3  C) 07/18/24 0908   Pulse 58 07/18/24 0952   Resp 16 07/18/24 0950   SpO2 96 % 07/18/24 0953   Vitals shown include unfiled device data.    Electronically Signed By: LAST Babcock CRNA  July 18, 2024  12:44 PM

## 2024-07-18 NOTE — OR NURSING
Patient and responsible adult given discharge instructions with no questions regarding instructions. Allan score 20. Pain level 0/10.  Discharged from unit via ambulation. Patient discharged to home.

## 2024-07-18 NOTE — H&P
HISTORY AND PHYSICAL - ENDOSCOPY   7/18/2024    Patient : Odilon Enriquez    Planned Procedures : Endoscopy    This is a 39 year old male with a need for an upper and lower endoscopy.  Upper endoscopy is needed for Gastroesohpageal Reflux and anemia .  Lower endoscopy is needed for Anemia.      Last EGD : never  History of GERD : YES  History of PUD : NO  History of Yung's : NO  On PPI's : NO    Last colonoscopy : Never  Family history of colon cancer : NO  Family history of colon polyps : NO  Personal history of colon cancer : NO  Personal history of colon polyps : NO  Rectal bleeding : NO  Changes in bowel habits :NO  Personal history of inflammatory bowel disease : NO    Past Medical History:  History reviewed. No pertinent past medical history.    Past Surgical History:  Past Surgical History:   Procedure Laterality Date    VASECTOMY         Family History History:  Family History   Problem Relation Age of Onset    Rheumatoid Arthritis Mother     Stomach Cancer Maternal Grandfather     Esophageal Cancer Maternal Grandfather        History of Tobacco Use:  History   Smoking Status    Former    Types: Cigarettes   Smokeless Tobacco    Current    Types: Chew       Current Medications:  No current outpatient medications on file.       Allergies:  Allergies   Allergen Reactions    Seasonal Allergies Itching     Sinus blockage       ROS:  Constitutional: negative  Eyes: negative  Ears, nose, mouth, throat, and face: positive for allergies  Respiratory: negative  Cardiovascular: negative  Gastrointestinal: positive for reflux symptoms and anemia  Genitourinary:negative  Integument/breast: negative  Hematologic/lymphatic: negative  Musculoskeletal: positive for aches and pains  Neurological: negative  Behavioral/Psych: positive for chewing tobacco and 20 alcohol drinks a week use  Endocrine: negative  Allergic/Immunologic: negative    PHYSICAL EXAM:     Vital signs: /92   Pulse 66   Temp 96.8  F (36  C)  "(Tympanic)   Resp 16   Ht 1.803 m (5' 11\")   Wt 96.4 kg (212 lb 9.6 oz)   SpO2 98%   BMI 29.65 kg/m     BMI: Body mass index is 29.65 kg/m .   General: Normal, healthy, cooperative, in no acute distress, alert   Skin: no rashes   Lungs: clear to auscultation   CV: Regular rate and rhythm    Abdominal: non-distended, soft, non-tender to palpation   Extremities: No cyanosis, clubbing or edema noted bilaterally in Upper and Lower Extremities   Neurological: without deficit    Assessment:   39 year old male with need for upper endoscopy for Gastroesohpageal Reflux and anemia  and lower endoscopy for Anemia:    Plan:   Will proceed with an esophagogastroduodenoscopy and colonoscopy.      The risks, benefits, and alternatives to the planned procedure were fully discussed with the patient and/or the patient's representative(s). The risks of bleeding, infection, death, missing pathology, the need for additional procedures intra-operatively, the possible need for intra-operative consults, the possible need for transfusion therapy, cardiopulmonary compromise, the possible need for additional surgery for a complication were discussed with the patient and/or the patient's representative(s). The patient's and/or patient's representative(s) questions were addressed and answered. Informed consent was obtained from the patient and/or the patient's representative(s). The patient and/or the patient's representative(s) consent to proceed.    Specific risks:  Risks include but are not limited to bleeding, perforation, missing lesions, need for additional procedures, reaction to anesthesia.  All the patients questions were answered.  The patient consents to proceed.  The procedures will be scheduled.      "

## 2024-07-18 NOTE — ANESTHESIA CARE TRANSFER NOTE
Patient: Odilon Enriquez    Procedure: Procedure(s):  Upper endoscopy with biopsy and colonoscopy       Diagnosis: GERD (gastroesophageal reflux disease) [K21.9]  Anemia [D64.9]  Diagnosis Additional Information: No value filed.    Anesthesia Type:   MAC     Note:    Oropharynx: spontaneously breathing  Level of Consciousness: drowsy  Oxygen Supplementation: room air    Independent Airway: airway patency satisfactory and stable  Dentition: dentition unchanged  Vital Signs Stable: post-procedure vital signs reviewed and stable  Report to RN Given: handoff report given  Patient transferred to: Phase II    Handoff Report: Identifed the Patient, Identified the Reponsible Provider, Reviewed the pertinent medical history, Discussed the surgical course, Reviewed Intra-OP anesthesia mangement and issues during anesthesia, Set expectations for post-procedure period and Allowed opportunity for questions and acknowledgement of understanding    Vitals:  Vitals Value Taken Time   BP     Temp     Pulse     Resp     SpO2         Electronically Signed By: LAST Babcock CRNA  July 18, 2024  9:06 AM

## 2024-07-18 NOTE — TELEPHONE ENCOUNTER
[facility] Call Center    Phone Message    May a detailed message be left on voicemail: yes     Reason for Call: Other: Genetic referral received on 06/27/2024.   Patient calling to see if the team can review it and call back to let him know if he can schedule with the team.   Thank you      Action Taken: Other: Peds Genetics    Travel Screening: Not Applicable     Date of Service:

## 2024-07-19 NOTE — TELEPHONE ENCOUNTER
Spoke with patient and assisted in scheduling appointment.     Future Appointments   Date Time Provider Department Center   7/30/2024 12:00 PM Pamela Hanna [facility]

## 2024-07-20 LAB
PATH REPORT.COMMENTS IMP SPEC: NORMAL
PATH REPORT.COMMENTS IMP SPEC: NORMAL
PATH REPORT.FINAL DX SPEC: NORMAL
PATH REPORT.GROSS SPEC: NORMAL
PATH REPORT.MICROSCOPIC SPEC OTHER STN: NORMAL
PATH REPORT.RELEVANT HX SPEC: NORMAL
PHOTO IMAGE: NORMAL

## 2024-07-25 NOTE — PROGRESS NOTES
GENETIC COUNSELING CONSULTATION NOTE    Date of visit: Jul 30, 2024    Presenting Information:   Odilon Enriquez is a 39 year old male referred to the [facility] due to beta thalassemia trait. He was seen for a genetic counseling appointment at the request of Dr. Capri Chua today.     Odilon saw Dr. Chua in Hem/Onc for evaluation of anemia. Per the notes obtained at this visit on 6/20/24, Odilon has been told he has iron deficiency since around age 8. More recently he has had chronic fatigue. Workup done by his PCP showed a hemoglobin of 13 on 5/24/2024. He was also found to have an MCV of 65. Other workup done showed an elevated AST at 47 and ALT at 127. Ferritin was also elevated at 525 with iron saturation of 44. He had an abdominal ultrasound due to the elevated liver enzymes which found gallstones. 5/29/2024 Peripheral smear showed Mild hypochromic, markedly microcytic anemia without evidence of red cell regeneration.     Dr. Chua also ordered genetic testing for the three common HFE mutations (C282Y, H63D, and S65C) for hereditary hemochromatosis which was negative/normal.     Hemoglobin electrophoresis showed elevated A2 without an elevated hemoglobin F. This can be from Beta thalassemia so Dr. Chua recommend a referral to genetics for further work up.     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     Family History: A three generation pedigree was obtained and scanned into the electronic medical record. The relevant portions are described below:    Children-   18 year old son who is healthy  14 year old daughter who is healthy  Siblings-   46 year old sister who has a history of an autoimmune condition that affects her muscle/tissue with onset in her teens. She also has RA. She has a 16 year old daughter and a 13 year old son who had leukemia at age 2 or 3 but is otherwise doing well  now.   39 year old maternal half-brother who is healthy and has no children  37 year old maternal half-brother who is healthy as is his 7 year old daughter  36 year old maternal half-brother who is healthy and has no children.   Parents-   Mother, age 68, has a history of RA and Hep C.   Odilon only recently met his biological father. He had no reported health concerns.   Maternal Relatives-   One maternal uncle and 7 aunts. There is limited information about them or their health.   Maternal grandmother is in her 80's with no reported health concerns.   Maternal grandfather passed away in his 70's due to cancer (esophageal or stomach).   Paternal Relatives- limited information about paternal relatives, but Odilon know that his paternal grandparents are .     Family history is otherwise largely non-contributory. Maternal ancestry is from [facility] and paternal ancestry is [place] and [place]. Consanguinity was denied.     Genetic Counseling Discussion:  For review, our bodies are made of cells that contain our chromosomes which are made up of long stretches of DNA containing our genes. Our genes serve as the instructions for our bodies to grow and function. We have two copies of each gene, one inherited from our mother and one inherited from our father.     Beta thalassemia is a blood disorder in which the body makes less blood cells to carry oxygen from the lungs to the rest of the body leading to a condition called anemia. Anemia can cause pale skin, weakness, fatigue, and more serious complications. Beta thalassemia is an autosomal recessive condition caused by inheriting two mutations in the HBB gene. There are two types of beta thalassemia:    Beta thalassemia major is the most severe type of beta thalassemia. People with beta thalassemia major have two severe mutations in the HBB gene and their bodies produce very little blood cells to carry oxygen through the body leading to severe anemia that requires  blood transfusions for life. Infants with beta thalassemia major may not gain weight or grow well and may develop yellowing of the skin and whites of the eyes. They may also develop an enlarged spleen, liver, and heart, and their bones may be misshapen. Babies with beta thalassemia major start to develop symptoms within the first two years of life.     Beta thalassemia intermedia is the less severe type of beta thalassemia. People with beta thalassemia intermedia also have two mutations the HBB gene but one or both mutations may be milder so their bodies still produce some blood cells to carry oxygen through the body. For this reason, people with beta thalassemia intermedia may have milder anemia, slow growth, and bone abnormalities. Some people with beta thalassemia intermedia may need regular blood transfusions, but they need them much less frequently than people with beta thalassemia major and some may only need blood transfusions when they are sick. People with beta thalassemia intermedia start to develop symptoms in childhood or adulthood.     Carriers of beta thalassemia are sometimes said to have beta thalassemia trait. Carriers have one mutation in the HBB gene. They may have very mild anemia and typically do not require treatment, but they can have a child with a severe form of beta thalassemia if their partner is also a carrier of beta thalassemia. Being a carrier for beta thalassemia is more common among the following populations: Mediterranean (1 in 30), North , , , Southeast , [place], and [place]. Carriers of beta thalassemia Carriers will have low MCV (<80 fL) and increased HbA2 on hemoglobin electrophoresis.     Individuals who are carriers for beta thalassemia should be screened for other HBB variants like Hb E and sickle cell (Hb S) because they can combine with a beta thalassemia variant to cause other hemoglobinopathies. For example, individuals with  sickle cell trait and beta thalassemia trait can have sickle-beta thalassemia, which can causes sickled blood cells in addition to a reduced amount of hemoglobin. This then leads to anemia, repeated infections, and frequent episodes of pain (as seen in sickle cell disease). The symptoms usually develop in early childhood and vary in severity depending on the amount of normal hemoglobin made.    Autosomal Recessive Inheritance:  Autosomal recessive means an individual needs two likely pathogenic/pathogenic variants, one on each copy of the gene, in order to be affected with the condition. When an individual only has one variant in the gene, they are considered a carrier for the condition. When both parents are carriers for the same recessive condition, there is a 1 in 4 (25%) chance of having an affected child, a 1 in 2 (50%) chance of having a child who is an unaffected carrier, and a 1 in 4 (25%) chance of having a child who is neither affected nor a carrier with each pregnancy.     Genetic Testing:  We reviewed the availability of genetic testing via Next Generation Sequencing (NGS) for analysis of the HBB gene, with the aim of determining if Odilon has beta thalassemia or if he is just a carrier for beta thalassemia. This testing will sequence the HBB gene looking for the variants known to cause beta thalassemia and the other variants in this gene known to cause other HBB conditions like Hb E and sickle cell.     Possible results of this testing are:  Negative: meaning normal or no mutations are identified in the genes that were tested/sequenced  Positive: meaning a mutation that is known to be associated with a particular set of symptoms is identified  Variant of uncertain significance (VUS): meaning a change in the DNA sequence of a particular gene was seen but there is not enough information or data yet to know if it explains the symptoms. If a VUS is identified, testing of other relatives may be helpful to  provide clarification.  In most cases, identification of a VUS does not confirm a diagnosis and does not result in any clinically actionable recommendations.    We discussed the potential benefits of genetic testing and why this genetic testing is medically indicated. A positive result will help determine the etiology of the abnormal hemoglobin electrophoresis noted in Odilon and may guide the medical management for him. Also, if a genetic cause is found for Odilon, it will give us a more accurate risk assessment for other family members, particularly his siblings and his children.    Next Generation Sequencing is a well established technology utilized by all molecular genetic labs throughout the country for identifying disease-causing mutations in various genes.  Next Generation Sequencing is currently the standard of care for genetic testing of single genes.  The recommended testing for Odilon is DIAGNOSTIC testing, and it is NOT investigational.    Odilon consented to genetic testing today. We will submit information for insurance prior authorization for HBB gene sequencing through [facility] [employer] and notify Odilon of the outcome and potential cost of testing. If he wants to proceed with testing, I can place the order for him to get his blood drawn at the [facility]. Results are ready in about 2 weeks.      It was a pleasure meeting Odilon today. He was encouraged to reach out to me if he has any further questions.     Plan:  We will submit information for insurance prior authorization for HBB gene sequencing through [employer] (test code [id number]) and notify Odilon of the outcome and potential cost of testing.  If he wants to proceed with testing, I will place the order for him to get his blood drawn at the [facility]. Results are ready in about 2 weeks.        Sonia Hanna MS, [facility]  Licensed Genetic Counselor   [facility]  Phone:  [phone]  Fax: [phone]    Time spent in consultation via telephone was approximately 40 minutes.

## 2024-07-30 ENCOUNTER — TRANSFERRED RECORDS (OUTPATIENT)
Dept: HEALTH INFORMATION MANAGEMENT | Facility: CLINIC | Age: 39
End: 2024-07-30

## 2024-07-30 ENCOUNTER — VIRTUAL VISIT (OUTPATIENT)
Dept: CONSULT | Facility: CLINIC | Age: 39
End: 2024-07-30
Attending: INTERNAL MEDICINE
Payer: COMMERCIAL

## 2024-07-30 DIAGNOSIS — Z71.83 ENCOUNTER FOR NONPROCREATIVE GENETIC COUNSELING: ICD-10-CM

## 2024-07-30 DIAGNOSIS — D64.9 ANEMIA, UNSPECIFIED TYPE: Primary | ICD-10-CM

## 2024-07-30 PROCEDURE — 96040 HC GENETIC COUNSELING, EACH 30 MINUTES: CPT | Mod: TEL,95 | Performed by: GENETIC COUNSELOR, MS

## 2024-07-30 NOTE — LETTER
7/30/2024      RE: Odilon Enriquez  [address] [facility] [address]     Dear Colleague,    Thank you for the opportunity to participate in the care of your patient, Odilon Enriquez, at the [facility] at [facility]. Please see a copy of my visit note below.    GENETIC COUNSELING CONSULTATION NOTE    Date of visit: Jul 30, 2024    Presenting Information:   Odilon Enriquez is a 39 year old male referred to the [facility] due to beta thalassemia trait. He was seen for a genetic counseling appointment at the request of Dr. Capri Chua today.     Odilon saw Dr. Chua in Hem/Onc for evaluation of anemia. Per the notes obtained at this visit on 6/20/24, Odilon has been told he has iron deficiency since around age 8. More recently he has had chronic fatigue. Workup done by his PCP showed a hemoglobin of 13 on 5/24/2024. He was also found to have an MCV of 65. Other workup done showed an elevated AST at 47 and ALT at 127. Ferritin was also elevated at 525 with iron saturation of 44. He had an abdominal ultrasound due to the elevated liver enzymes which found gallstones. 5/29/2024 Peripheral smear showed Mild hypochromic, markedly microcytic anemia without evidence of red cell regeneration.     Dr. Chua also ordered genetic testing for the three common HFE mutations (C282Y, H63D, and S65C) for hereditary hemochromatosis which was negative/normal.     Hemoglobin electrophoresis showed elevated A2 without an elevated hemoglobin F. This can be from Beta thalassemia so Dr. Chua recommend a referral to genetics for further work up.     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     Family History: A three generation pedigree was obtained and scanned into the electronic medical record. The relevant portions are described  below:    Children-   18 year old son who is healthy  14 year old daughter who is healthy  Siblings-   46 year old sister who has a history of an autoimmune condition that affects her muscle/tissue with onset in her teens. She also has RA. She has a 16 year old daughter and a 13 year old son who had leukemia at age 2 or 3 but is otherwise doing well now.   39 year old maternal half-brother who is healthy and has no children  37 year old maternal half-brother who is healthy as is his 7 year old daughter  36 year old maternal half-brother who is healthy and has no children.   Parents-   Mother, age 68, has a history of RA and Hep C.   Odilon only recently met his biological father. He had no reported health concerns.   Maternal Relatives-   One maternal uncle and 7 aunts. There is limited information about them or their health.   Maternal grandmother is in her 80's with no reported health concerns.   Maternal grandfather passed away in his 70's due to cancer (esophageal or stomach).   Paternal Relatives- limited information about paternal relatives, but Odilon know that his paternal grandparents are .     Family history is otherwise largely non-contributory. [place] [facility] [place] paternal ancestry is [place] and [place]. Consanguinity was denied.     Genetic Counseling Discussion:  For review, our bodies are made of cells that contain our chromosomes which are made up of long stretches of DNA containing our genes. Our genes serve as the instructions for our bodies to grow and function. We have two copies of each gene, one inherited from our mother and one inherited from our father.     Beta thalassemia is a blood disorder in which the body makes less blood cells to carry oxygen from the lungs to the rest of the body leading to a condition called anemia. Anemia can cause pale skin, weakness, fatigue, and more serious complications. Beta thalassemia is an autosomal recessive condition caused by  inheriting two mutations in the HBB gene. There are two types of beta thalassemia:    Beta thalassemia major is the most severe type of beta thalassemia. People with beta thalassemia major have two severe mutations in the HBB gene and their bodies produce very little blood cells to carry oxygen through the body leading to severe anemia that requires blood transfusions for life. Infants with beta thalassemia major may not gain weight or grow well and may develop yellowing of the skin and whites of the eyes. They may also develop an enlarged spleen, liver, and heart, and their bones may be misshapen. Babies with beta thalassemia major start to develop symptoms within the first two years of life.     Beta thalassemia intermedia is the less severe type of beta thalassemia. People with beta thalassemia intermedia also have two mutations the HBB gene but one or both mutations may be milder so their bodies still produce some blood cells to carry oxygen through the body. For this reason, people with beta thalassemia intermedia may have milder anemia, slow growth, and bone abnormalities. Some people with beta thalassemia intermedia may need regular blood transfusions, but they need them much less frequently than people with beta thalassemia major and some may only need blood transfusions when they are sick. People with beta thalassemia intermedia start to develop symptoms in childhood or adulthood.     Carriers of beta thalassemia are sometimes said to have beta thalassemia trait. Carriers have one mutation in the HBB gene. They may have very mild anemia and typically do not require treatment, but they can have a child with a severe form of beta thalassemia if their partner is also a carrier of beta thalassemia. Being a carrier for beta thalassemia is more common among the following populations: Mediterranean (1 in 30), North , , , Southeast , [place], and South American. Carriers of beta  thalassemia Carriers will have low MCV (<80 fL) and increased HbA2 on hemoglobin electrophoresis.     Individuals who are carriers for beta thalassemia should be screened for other HBB variants like Hb E and sickle cell (Hb S) because they can combine with a beta thalassemia variant to cause other hemoglobinopathies. For example, individuals with sickle cell trait and beta thalassemia trait can have sickle-beta thalassemia, which can causes sickled blood cells in addition to a reduced amount of hemoglobin. This then leads to anemia, repeated infections, and frequent episodes of pain (as seen in sickle cell disease). The symptoms usually develop in early childhood and vary in severity depending on the amount of normal hemoglobin made.    Autosomal Recessive Inheritance:  Autosomal recessive means an individual needs two likely pathogenic/pathogenic variants, one on each copy of the gene, in order to be affected with the condition. When an individual only has one variant in the gene, they are considered a carrier for the condition. When both parents are carriers for the same recessive condition, there is a 1 in 4 (25%) chance of having an affected child, a 1 in 2 (50%) chance of having a child who is an unaffected carrier, and a 1 in 4 (25%) chance of having a child who is neither affected nor a carrier with each pregnancy.     Genetic Testing:  We reviewed the availability of genetic testing via Next Generation Sequencing (NGS) for analysis of the HBB gene, with the aim of determining if Odilon has beta thalassemia or if he is just a carrier for beta thalassemia. This testing will sequence the HBB gene looking for the variants known to cause beta thalassemia and the other variants in this gene known to cause other HBB conditions like Hb E and sickle cell.     Possible results of this testing are:  Negative: meaning normal or no mutations are identified in the genes that were tested/sequenced  Positive: meaning a  mutation that is known to be associated with a particular set of symptoms is identified  Variant of uncertain significance (VUS): meaning a change in the DNA sequence of a particular gene was seen but there is not enough information or data yet to know if it explains the symptoms. If a VUS is identified, testing of other relatives may be helpful to provide clarification.  In most cases, identification of a VUS does not confirm a diagnosis and does not result in any clinically actionable recommendations.    We discussed the potential benefits of genetic testing and why this genetic testing is medically indicated. A positive result will help determine the etiology of the abnormal hemoglobin electrophoresis noted in Odilon and may guide the medical management for him. Also, if a genetic cause is found for Odilon, it will give us a more accurate risk assessment for other family members, particularly his siblings and his children.    Next Generation Sequencing is a well established technology utilized by all molecular genetic labs throughout the country for identifying disease-causing mutations in various genes.  Next Generation Sequencing is currently the standard of care for genetic testing of single genes.  The recommended testing for Odilon is DIAGNOSTIC testing, and it is NOT investigational.    Odilon consented to genetic testing today. We will submit information for insurance prior authorization for HBB gene sequencing through [employer] and notify Odilon of the outcome and potential cost of testing. If he wants to proceed with testing, I can place the order for him to get his blood drawn at the [facility]. Results are ready in about 2 weeks.      It was a pleasure meeting Odilon today. He was encouraged to reach out to me if he has any further questions.     Plan:  We will submit information for insurance prior authorization for HBB gene sequencing through [employer] (test code [id number])  and notify Odilon of the outcome and potential cost of testing.  If he wants to proceed with testing, I will place the order for him to get his blood drawn at the [facility] clinic. Results are ready in about 2 weeks.        Sonia Hanna MS, [facility]  Licensed Genetic Counselor   [facility]  Phone: [phone]  Fax: [phone]    Time spent in consultation via telephone was approximately 40 minutes.      Please do not hesitate to contact me if you have any questions/concerns.     Sincerely,       Pamela Hanna, GC

## 2024-08-05 NOTE — PROGRESS NOTES
Assessment & Plan     (R79.89) Elevated LFTs  (primary encounter diagnosis)  Comment: stable, asymptomatic  Plan: Will continue f/up with Tl Eduardo. Will also limit his alcohol intake.     (J30.2) Seasonal allergic rhinitis, unspecified trigger  Comment: stable  Plan: C/W Zyrtec.     (D64.9) Anemia, unspecified type  Comment: stable, work-up thus far negative; upper and lower endoscopies normal  Plan: Will continue follow-up with genetics and hematology.     (R76.8) Positive NESSA (antinuclear antibody)  Comment: unsure cause, feels well, work up thus far negative  Plan: LFTs elevated, undergoing additional work-up, will possibly have liver biopsy          The longitudinal plan of care for the diagnosis(es)/condition(s) as documented were addressed during this visit. Due to the added complexity in care, I will continue to support Odilon in the subsequent management and with ongoing continuity of care.    Petey Donald is a 39 year old, presenting for the following health issues:  Abnormal Labs (Follow up)    History of Present Illness       Reason for visit:  Follow up for blood work    He eats 2-3 servings of fruits and vegetables daily.He consumes 1 sweetened beverage(s) daily.He exercises with enough effort to increase his heart rate 20 to 29 minutes per day.  He exercises with enough effort to increase his heart rate 4 days per week.   He is taking medications regularly.       Follow-up for Abnormal Labs;     Patient was seen on 5/24/24. Hgb was low at 13.0. Iron levels, Vit B12, and folate were normal. IFOB was negative.  Upper and lower endoscopies were unremarkable. Was referred to hematology. Genetic referral was placed for gene testing. Possible carrier beta thalassemia. Has follow-up with Dr. Chua on 9/19/24 to discuss. May also have additional gene testing, but waiting on insurance approval.     Secondly, his LFTs were elevated with a low ceruloplasmin and a positive NESSA. Was referred to GI. Copper  labs were ordered. Plan is to proceed with liver biopsy if copper levels are abnormal. An eye exam was recommended to rule out Perez Fleischer rings. Scheduled next month. Has follow up with Tl Clark scheduled in 10/2024.     Overall, he is feeling well. Denies chest pain, shortness of breath, dizziness, syncope, or palpitations. No abdominal pain. No nausea or vomiting.        Review of Systems  Constitutional, HEENT, cardiovascular, pulmonary, gi and gu systems are negative, except as otherwise noted.      Objective    /88 (BP Location: Right arm, Patient Position: Sitting, Cuff Size: Adult Large)   Pulse 50   Temp 98.1  F (36.7  C) (Tympanic)   Resp 16   Wt 97.9 kg (215 lb 12.8 oz)   SpO2 97%   BMI 30.10 kg/m    Body mass index is 30.1 kg/m .  Physical Exam   GENERAL: alert and no distress  EYES: Eyes grossly normal to inspection, PERRL and conjunctivae and sclerae normal  HENT: ear canals and TM's normal, nose and mouth without ulcers or lesions  NECK: no adenopathy, no asymmetry, masses, or scars  RESP: lungs clear to auscultation - no rales, rhonchi or wheezes  CV: regular rate and rhythm, normal S1 S2, no S3 or S4, no murmur, click or rub, no peripheral edema  MS: no gross musculoskeletal defects noted, no edema  NEURO: Normal strength and tone, mentation intact and speech normal  PSYCH: mentation appears normal, affect normal/bright            Signed Electronically by: Janeth Tinoco NP

## 2024-08-06 ENCOUNTER — OFFICE VISIT (OUTPATIENT)
Dept: FAMILY MEDICINE | Facility: OTHER | Age: 39
End: 2024-08-06
Attending: NURSE PRACTITIONER
Payer: COMMERCIAL

## 2024-08-06 VITALS
OXYGEN SATURATION: 97 % | WEIGHT: 215.8 LBS | HEART RATE: 50 BPM | BODY MASS INDEX: 30.1 KG/M2 | DIASTOLIC BLOOD PRESSURE: 88 MMHG | TEMPERATURE: 98.1 F | RESPIRATION RATE: 16 BRPM | SYSTOLIC BLOOD PRESSURE: 137 MMHG

## 2024-08-06 DIAGNOSIS — J30.2 SEASONAL ALLERGIC RHINITIS, UNSPECIFIED TRIGGER: ICD-10-CM

## 2024-08-06 DIAGNOSIS — D64.9 ANEMIA, UNSPECIFIED TYPE: ICD-10-CM

## 2024-08-06 DIAGNOSIS — R79.89 ELEVATED LFTS: Primary | ICD-10-CM

## 2024-08-06 DIAGNOSIS — R76.8 POSITIVE ANA (ANTINUCLEAR ANTIBODY): ICD-10-CM

## 2024-08-06 PROCEDURE — G2211 COMPLEX E/M VISIT ADD ON: HCPCS | Performed by: NURSE PRACTITIONER

## 2024-08-06 PROCEDURE — 99213 OFFICE O/P EST LOW 20 MIN: CPT | Performed by: NURSE PRACTITIONER

## 2024-08-06 RX ORDER — CETIRIZINE HYDROCHLORIDE 10 MG/1
10 TABLET ORAL 2 TIMES DAILY
Qty: 180 TABLET | Refills: 0 | Status: SHIPPED | OUTPATIENT
Start: 2024-08-06 | End: 2024-08-06

## 2024-08-06 RX ORDER — CETIRIZINE HYDROCHLORIDE 10 MG/1
10 TABLET ORAL 2 TIMES DAILY
Qty: 90 TABLET | Refills: 0 | Status: SHIPPED | OUTPATIENT
Start: 2024-08-06

## 2024-08-06 ASSESSMENT — PAIN SCALES - GENERAL: PAINLEVEL: NO PAIN (0)

## 2024-08-07 ENCOUNTER — MYC MEDICAL ADVICE (OUTPATIENT)
Dept: CONSULT | Facility: CLINIC | Age: 39
End: 2024-08-07
Payer: COMMERCIAL

## 2024-08-08 ENCOUNTER — TRANSFERRED RECORDS (OUTPATIENT)
Dept: HEALTH INFORMATION MANAGEMENT | Facility: CLINIC | Age: 39
End: 2024-08-08
Payer: COMMERCIAL

## 2024-09-18 ENCOUNTER — LAB (OUTPATIENT)
Dept: LAB | Facility: OTHER | Age: 39
End: 2024-09-18
Payer: COMMERCIAL

## 2024-09-18 DIAGNOSIS — D64.9 ANEMIA, UNSPECIFIED TYPE: ICD-10-CM

## 2024-09-18 LAB
BASOPHILS # BLD AUTO: 0 10E3/UL (ref 0–0.2)
BASOPHILS NFR BLD AUTO: 1 %
EOSINOPHIL # BLD AUTO: 0.2 10E3/UL (ref 0–0.7)
EOSINOPHIL NFR BLD AUTO: 4 %
ERYTHROCYTE [DISTWIDTH] IN BLOOD BY AUTOMATED COUNT: 14.1 % (ref 10–15)
FERRITIN SERPL-MCNC: 362 NG/ML (ref 31–409)
HCT VFR BLD AUTO: 38.5 % (ref 40–53)
HGB BLD-MCNC: 11.9 G/DL (ref 13.3–17.7)
IMM GRANULOCYTES # BLD: 0 10E3/UL
IMM GRANULOCYTES NFR BLD: 0 %
IRON BINDING CAPACITY (ROCHE): 285 UG/DL (ref 240–430)
IRON SATN MFR SERPL: 20 % (ref 15–46)
IRON SERPL-MCNC: 57 UG/DL (ref 61–157)
LYMPHOCYTES # BLD AUTO: 2 10E3/UL (ref 0.8–5.3)
LYMPHOCYTES NFR BLD AUTO: 34 %
MCH RBC QN AUTO: 20.4 PG (ref 26.5–33)
MCHC RBC AUTO-ENTMCNC: 30.9 G/DL (ref 31.5–36.5)
MCV RBC AUTO: 66 FL (ref 78–100)
MONOCYTES # BLD AUTO: 0.5 10E3/UL (ref 0–1.3)
MONOCYTES NFR BLD AUTO: 8 %
NEUTROPHILS # BLD AUTO: 3.2 10E3/UL (ref 1.6–8.3)
NEUTROPHILS NFR BLD AUTO: 54 %
NRBC # BLD AUTO: 0 10E3/UL
NRBC BLD AUTO-RTO: 0 /100
PLATELET # BLD AUTO: 295 10E3/UL (ref 150–450)
RBC # BLD AUTO: 5.83 10E6/UL (ref 4.4–5.9)
WBC # BLD AUTO: 5.9 10E3/UL (ref 4–11)

## 2024-09-18 PROCEDURE — 83550 IRON BINDING TEST: CPT

## 2024-09-18 PROCEDURE — 85025 COMPLETE CBC W/AUTO DIFF WBC: CPT

## 2024-09-18 PROCEDURE — 82728 ASSAY OF FERRITIN: CPT

## 2024-09-18 PROCEDURE — 83540 ASSAY OF IRON: CPT

## 2024-09-18 PROCEDURE — 36415 COLL VENOUS BLD VENIPUNCTURE: CPT

## 2024-09-19 ENCOUNTER — ONCOLOGY VISIT (OUTPATIENT)
Dept: ONCOLOGY | Facility: OTHER | Age: 39
End: 2024-09-19
Attending: INTERNAL MEDICINE
Payer: COMMERCIAL

## 2024-09-19 VITALS
BODY MASS INDEX: 30 KG/M2 | DIASTOLIC BLOOD PRESSURE: 70 MMHG | HEART RATE: 60 BPM | OXYGEN SATURATION: 98 % | TEMPERATURE: 98.4 F | SYSTOLIC BLOOD PRESSURE: 128 MMHG | WEIGHT: 214.29 LBS | HEIGHT: 71 IN

## 2024-09-19 DIAGNOSIS — D64.9 ANEMIA, UNSPECIFIED TYPE: Primary | ICD-10-CM

## 2024-09-19 PROCEDURE — 99214 OFFICE O/P EST MOD 30 MIN: CPT | Performed by: INTERNAL MEDICINE

## 2024-09-19 ASSESSMENT — PAIN SCALES - GENERAL: PAINLEVEL: NO PAIN (0)

## 2024-09-19 NOTE — PROGRESS NOTES
HEMATOLOGY FOLLOW UP NOTE  Sep 19, 2024    Reason for follow up: Anemia.     HISTORY OF PRESENT ILLNESS  Odilon Enriquez is a 39 year old male with PMH as stated below who is seen in the hematology clinic for anemia.    His history in short is as follows:    Mr Enriquez was having chronic fatigue for a few years.  He was found to have low testosterone and therefore was evaluated by his primary care, Ms. Tinoco     Workup done at that time showed a hemoglobin of 13 on 5/24/2024.  He was also found to have an MCV of 65.  Other workup done showed an elevated AST at 47 and ALT at 127.  Ferritin was also elevated at 525 with iron saturation of 44.    He states he was told around 10 years that he is iron deficient.  He is currently not on iron supplementation.  No prior history of transfusion.    7/18/2024: EGD and colonoscopy: Minimal gastritis.  Squamous columnar junction at 40.  Normal colonoscopy.  Diverticulosis not identified.  Hemorrhoids were identified.    No family history of thalassemia.  Has [place] ancestry.      Interim history:    He is doing well.  Denies any worsening fatigue.  Denies any blood in stools or change in color of stools.     REVIEW OF SYSTEMS    A 12-point ROS negative except as in HPI      Current Outpatient Medications   Medication Sig Dispense Refill    azelastine-fluticasone (DYMISTA) 137-50 MCG/ACT nasal spray Spray 1 spray into both nostrils 2 times daily (Patient not taking: Reported on 6/20/2024) 23 g 11    bisacodyl (DULCOLAX) 5 MG EC tablet Take 1 tablet (5 mg) by mouth daily as needed for constipation Take 2 tablets by mouth 2 days prior to procedure. Take the remaining 2 tablets by mouth at 3pm the day prior to procedure. (Patient not taking: Reported on 6/20/2024) 4 tablet 0    cetirizine (ZYRTEC) 10 MG tablet Take 1 tablet (10 mg) by mouth 2 times daily 90 tablet 0       Allergies   Allergen Reactions    Seasonal Allergies Itching     Sinus blockage     Immunization History    Administered Date(s) Administered    Flu, Unspecified 11/28/2016    [id number] Novel Flu- Nasal 11/12/2009    Influenza (IIV3) PF 01/11/2013    Influenza Vaccine >6 months,quad, PF 10/07/2014    Influenza Vaccine, 6+MO IM (QUADRIVALENT W/PRESERVATIVES) 11/28/2016    TDAP (Adacel,Boostrix) 03/31/2008    TDAP Vaccine (Adacel) 08/20/2019    Tdap (Adacel,boostrix) 03/31/2008       No past medical history on file.    Past Surgical History:   Procedure Laterality Date    ENDOSCOPY UPPER, COLONOSCOPY, COMBINED N/A 7/18/2024    Procedure: Upper endoscopy with biopsy and colonoscopy;  Surgeon: Patrick Kumar MD;  Location: [facility] OR    VASECTOMY         SOCIAL HISTORY  History   Smoking Status    Former    Types: Cigarettes   Smokeless Tobacco    Current    Types: Chew    Social History    Substance and Sexual Activity      Alcohol use: Yes        Comment: every weekend     History   Drug Use Unknown       FAMILY HISTORY  Family History   Problem Relation Age of Onset    Rheumatoid Arthritis Mother     Stomach Cancer Maternal Grandfather     Esophageal Cancer Maternal Grandfather        PHYSICAL EXAMINATION  There were no vitals taken for this visit.  Wt Readings from Last 2 Encounters:   08/06/24 97.9 kg (215 lb 12.8 oz)   07/18/24 96.4 kg (212 lb 9.6 oz)     Physical Exam  Constitutional:       Appearance: Normal appearance.   Pulmonary:      Effort: Pulmonary effort is normal.   Neurological:      General: No focal deficit present.      Mental Status: He is alert and oriented to person, place, and time.     Laboratory and imaging:     Latest Reference Range & Units 09/18/24 16:45   Ferritin 31 - 409 ng/mL 362   Iron 61 - 157 ug/dL 57 (L)   Iron Binding Capacity 240 - 430 ug/dL 285   Iron Sat Index 15 - 46 % 20   WBC 4.0 - 11.0 10e3/uL 5.9   Hemoglobin 13.3 - 17.7 g/dL 11.9 (L)   Hematocrit 40.0 - 53.0 % 38.5 (L)   Platelet Count 150 - 450 10e3/uL 295   (L): Data is abnormally low  5/29/2024: Peripheral smear:Mild  hypochromic, markedly microcytic anemia without evidence of red cell regeneration     ASSESSMENT AND PLAN    1.  Microcytic anemia:    Anemia with microcytosis seen on labs done 5/24/2024.  No prior lab seen for comparison.  Was told as a child that he has iron deficiency.  Other findings show an elevated RBC count.  He was also found to have elevated ferritin of 525 with iron saturation of 44.  Again no other labs for comparison.    The microcytic anemia with elevated RBC count with about iron deficiency was concerning for thalassemia.  Therefore a hemoglobin electrophoresis was done which showed an elevated hemoglobin A2 which can sometimes be seen in beta thalassemia.    He was referred to genetic testing however would like to hold off for now.  Discussed at this time if this is thalassemia this would be thalassemia minor and therefore should overall be asymptomatic.  He is worried whether his children might have thalassemia or not.  I would recommend that he should first be checked for anemia and if there is concern for microcytic anemia workup should be done with a hemoglobin electrophoresis and possible genetics referral.    Other workup done showed vitamin B12 level and folic acid which were within normal limits.  Also no concern for bleeding.  He did have an EGD and colonoscopy which did not show any source of bleeding.    2.  Elevated ferritin:    Found to have elevated liver enzymes and elevated ferritin.  Iron saturation is not elevated.  Hemochromatosis panel was checked which was negative.  Concern for iron overload is low ferritin has improved to 362 on 9/18/2024.  He did see him GI at [facility] testing for Ronan disease was done.  He will follow-up with GI again to get the results.    At this point I would recommend his CBCD and iron panel with ferritin to monitor every 6 months.  If he gets more anemic I would recommend referral back to hematology.  He at this point wants to follow-up with his  primary care which I think is very reasonable.    Total time spent on the patient on day of encounter was 35 minutes doing chart review, review of test results, interpretation of results, patient visit and documentation.         Capri Chua MD

## 2024-09-19 NOTE — Clinical Note
HI   I saw Mr. Enriquez for anemia.  His iron panel, vitamin B12 and folate were normal.  He does however have very microcytic anemia and given that his iron panel was not low there was concern that this may be thalassemia.  The hemoglobin electrophoresis did show an elevated hemoglobin A2 that sometimes can be seen with thalassemia.  I had referred him to genetics however he wanted to hold off because of the cost.  This point I think he should have his CBCD and ferritin checked at least every 6 months.  He wants to follow-up with you rather than come to hematology.  If his anemia worsens please refer him back.  Thank you

## 2024-09-19 NOTE — NURSING NOTE
"Oncology Rooming Note    September 19, 2024 3:11 PM   Odilon Enriquez is a 39 year old male who presents for:    Chief Complaint   Patient presents with    Hematology     Follow up. Anemia      Initial Vitals: /70 (BP Location: Right arm, Patient Position: Sitting, Cuff Size: Adult Large)   Pulse 60   Temp 98.4  F (36.9  C) (Tympanic)   Ht 1.803 m (5' 11\")   Wt 97.2 kg (214 lb 4.6 oz)   SpO2 98%   BMI 29.89 kg/m   Estimated body mass index is 29.89 kg/m  as calculated from the following:    Height as of this encounter: 1.803 m (5' 11\").    Weight as of this encounter: 97.2 kg (214 lb 4.6 oz). Body surface area is 2.21 meters squared.  No Pain (0) Comment: Data Unavailable   No LMP for male patient.  Allergies reviewed: Yes  Medications reviewed: Yes    Medications: Medication refills not needed today.  Pharmacy name entered into [facility]:    [id number] - [place], [place] - [address]  [id number] - [place], [place] - [address]    Frailty Screening:   Is the patient here for a new oncology consult visit in cancer care? 2. No      Clinical concerns: none      Courtney Elizalde LPN              "

## 2024-09-20 PROBLEM — D64.9 ANEMIA, UNSPECIFIED: Status: ACTIVE | Noted: 2024-09-20

## 2025-03-17 ENCOUNTER — APPOINTMENT (OUTPATIENT)
Dept: GENERAL RADIOLOGY | Facility: HOSPITAL | Age: 40
End: 2025-03-17
Attending: PHYSICIAN ASSISTANT
Payer: COMMERCIAL

## 2025-03-17 ENCOUNTER — HOSPITAL ENCOUNTER (EMERGENCY)
Facility: HOSPITAL | Age: 40
Discharge: HOME | End: 2025-03-17
Payer: COMMERCIAL

## 2025-03-17 VITALS
SYSTOLIC BLOOD PRESSURE: 144 MMHG | DIASTOLIC BLOOD PRESSURE: 90 MMHG | TEMPERATURE: 101.4 F | RESPIRATION RATE: 18 BRPM | OXYGEN SATURATION: 96 % | HEART RATE: 82 BPM

## 2025-03-17 DIAGNOSIS — J18.9 PNEUMONIA: ICD-10-CM

## 2025-03-17 LAB
FLUAV RNA SPEC QL NAA+PROBE: NEGATIVE
FLUBV RNA RESP QL NAA+PROBE: NEGATIVE
RSV RNA SPEC NAA+PROBE: NEGATIVE
SARS-COV-2 RNA RESP QL NAA+PROBE: NEGATIVE

## 2025-03-17 PROCEDURE — G0463 HOSPITAL OUTPT CLINIC VISIT: HCPCS | Mod: 25

## 2025-03-17 PROCEDURE — 87637 SARSCOV2&INF A&B&RSV AMP PRB: CPT | Performed by: PHYSICIAN ASSISTANT

## 2025-03-17 PROCEDURE — 99213 OFFICE O/P EST LOW 20 MIN: CPT | Performed by: PHYSICIAN ASSISTANT

## 2025-03-17 PROCEDURE — 71046 X-RAY EXAM CHEST 2 VIEWS: CPT

## 2025-03-17 RX ORDER — DOXYCYCLINE 100 MG/1
100 CAPSULE ORAL 2 TIMES DAILY
Qty: 20 CAPSULE | Refills: 0 | Status: SHIPPED | OUTPATIENT
Start: 2025-03-17 | End: 2025-03-27

## 2025-03-17 RX ORDER — ALBUTEROL SULFATE 90 UG/1
2 INHALANT RESPIRATORY (INHALATION) EVERY 6 HOURS PRN
Qty: 18 G | Refills: 0 | Status: SHIPPED | OUTPATIENT
Start: 2025-03-17

## 2025-03-17 RX ORDER — AZITHROMYCIN 250 MG/1
TABLET, FILM COATED ORAL
Qty: 6 TABLET | Refills: 0 | Status: SHIPPED | OUTPATIENT
Start: 2025-03-17 | End: 2025-03-22

## 2025-03-17 ASSESSMENT — ACTIVITIES OF DAILY LIVING (ADL): ADLS_ACUITY_SCORE: 41

## 2025-03-17 NOTE — ED TRIAGE NOTES
"\"Off and on fever, sore throat, headache and cough for about 6 weeks.  Shortness of breath started a couple of days ago.\"         "

## 2025-03-17 NOTE — ED PROVIDER NOTES
History     Chief Complaint   Patient presents with    URI     HPI  Odilon Enriquez is a 39 year old male who presents to the urgent care for evaluation of shortness of breath fevers chills.  He states he has had cold symptoms ongoing since January.  He has had some increasing shortness of breath as of recently.  Patient denies any wheezing no chest pain.    Allergies:  Allergies   Allergen Reactions    Seasonal Allergies Itching     Sinus blockage       Problem List:    Patient Active Problem List    Diagnosis Date Noted    Anemia, unspecified-saw hematology, possible thalassemia-delcined genetic testing, recommend rechecking ferritin and CBC every 6 months. 09/20/2024     Priority: Medium        Past Medical History:    No past medical history on file.    Past Surgical History:    Past Surgical History:   Procedure Laterality Date    ENDOSCOPY UPPER, COLONOSCOPY, COMBINED N/A 7/18/2024    Procedure: Upper endoscopy with biopsy and colonoscopy;  Surgeon: Patrick Kumar MD;  Location: [facility]    VASECTOMY         Family History:    Family History   Problem Relation Age of Onset    Rheumatoid Arthritis Mother     Stomach Cancer Maternal Grandfather     Esophageal Cancer Maternal Grandfather        Social History:  Marital Status:   [2]  Social History     Tobacco Use    Smoking status: Former     Types: Cigarettes     Start date: 1/1/1997     Passive exposure: Past    Smokeless tobacco: Current     Types: Chew   Vaping Use    Vaping status: Former   Substance Use Topics    Alcohol use: Yes     Comment: every weekend    Drug use: Never        Medications:    albuterol (PROAIR HFA/PROVENTIL HFA/VENTOLIN HFA) 108 (90 Base) MCG/ACT inhaler  azelastine-fluticasone (DYMISTA) 137-50 MCG/ACT nasal spray  azithromycin (ZITHROMAX) 250 MG tablet  cetirizine (ZYRTEC) 10 MG tablet  doxycycline hyclate (VIBRAMYCIN) 100 MG capsule          Review of Systems   All other systems reviewed and are negative.      Physical  "Exam   BP: (!) 144/90  Pulse: 82  Temp: (!) 101.4  F (38.6  C) (\"took Nyquil  and  Excedrin at 0400\")  Resp: 18  SpO2: 96 %      Physical Exam  Constitutional:       General: He is not in acute distress.     Appearance: Normal appearance. He is normal weight. He is not ill-appearing, toxic-appearing or diaphoretic.   HENT:      Head: Normocephalic and atraumatic.      Right Ear: External ear normal.      Left Ear: External ear normal.   Eyes:      Extraocular Movements: Extraocular movements intact.      Conjunctiva/sclera: Conjunctivae normal.      Pupils: Pupils are equal, round, and reactive to light.   Cardiovascular:      Rate and Rhythm: Normal rate.   Pulmonary:      Effort: Pulmonary effort is normal. No respiratory distress.      Breath sounds: Normal breath sounds and air entry.   Musculoskeletal:         General: Normal range of motion.   Skin:     General: Skin is warm and dry.      Coloration: Skin is not jaundiced or pale.   Neurological:      Mental Status: He is alert and oriented to person, place, and time. Mental status is at baseline.      Cranial Nerves: No cranial nerve deficit.   Psychiatric:         Mood and Affect: Mood normal.         ED Course     ED Course as of 03/17/25 1110   Mon Mar 17, 2025   1057 Chest XR,  PA & LAT     Procedures       Patient declined Tylenol or ibuprofen at this time for his fever management.  Chest x-ray was obtained which shows the patient having a left lower lobe pneumonia radiology does agree.  Will treat dual therapy at this time patient to follow-up with primary care doctor return if symptoms are worsening.  Patient started on azithromycin doxycycline and albuterol.           Results for orders placed or performed during the hospital encounter of 03/17/25 (from the past 24 hours)   Chest XR,  PA & LAT    Narrative    PROCEDURE:  XR CHEST 2 VIEWS    HISTORY: short of breath, .    COMPARISON:  None.    FINDINGS:  The cardiomediastinal contours are normal. The " trachea is midline.  Patchy consolidation is present in the left lower lobe. No effusion or  pneumothorax.    No suspicious osseous lesion or subdiaphragmatic free air.      Impression    IMPRESSION:      Left lower lobe pneumonia.    JAZMÍN ZAPIEN MD         SYSTEM ID:  [id number]       Medications - No data to display    Assessments & Plan (with Medical Decision Making)     I have reviewed the nursing notes.    I have reviewed the findings, diagnosis, plan and need for follow up with the patient.        New Prescriptions    ALBUTEROL (PROAIR HFA/PROVENTIL HFA/VENTOLIN HFA) 108 (90 BASE) MCG/ACT INHALER    Inhale 2 puffs into the lungs every 6 hours as needed for shortness of breath, wheezing or cough.    AZITHROMYCIN (ZITHROMAX) 250 MG TABLET    Take 2 tablets (500 mg) by mouth daily for 1 day, THEN 1 tablet (250 mg) daily for 4 days.    DOXYCYCLINE HYCLATE (VIBRAMYCIN) 100 MG CAPSULE    Take 1 capsule (100 mg) by mouth 2 times daily for 10 days.       Final diagnoses:   Pneumonia       3/17/2025   [facility]       Neal Sánchez PA-C  03/17/25 1112

## 2025-03-19 ENCOUNTER — HOSPITAL ENCOUNTER (EMERGENCY)
Facility: HOSPITAL | Age: 40
Discharge: HOME | End: 2025-03-19
Attending: NURSE PRACTITIONER
Payer: COMMERCIAL

## 2025-03-19 ENCOUNTER — TELEPHONE (OUTPATIENT)
Dept: FAMILY MEDICINE | Facility: OTHER | Age: 40
End: 2025-03-19

## 2025-03-19 VITALS
WEIGHT: 198.2 LBS | SYSTOLIC BLOOD PRESSURE: 151 MMHG | RESPIRATION RATE: 12 BRPM | OXYGEN SATURATION: 98 % | BODY MASS INDEX: 27.75 KG/M2 | HEIGHT: 71 IN | DIASTOLIC BLOOD PRESSURE: 101 MMHG | TEMPERATURE: 97.3 F | HEART RATE: 65 BPM

## 2025-03-19 DIAGNOSIS — J18.9 COMMUNITY ACQUIRED PNEUMONIA OF LEFT LOWER LOBE OF LUNG: Primary | ICD-10-CM

## 2025-03-19 LAB
ANION GAP SERPL CALCULATED.3IONS-SCNC: 12 MMOL/L (ref 7–15)
BASOPHILS # BLD AUTO: 0 10E3/UL (ref 0–0.2)
BASOPHILS NFR BLD AUTO: 0 %
BUN SERPL-MCNC: 5.1 MG/DL (ref 6–20)
CALCIUM SERPL-MCNC: 9.5 MG/DL (ref 8.8–10.4)
CHLORIDE SERPL-SCNC: 102 MMOL/L (ref 98–107)
CREAT SERPL-MCNC: 0.82 MG/DL (ref 0.67–1.17)
CRP SERPL-MCNC: 21.54 MG/L
EGFRCR SERPLBLD CKD-EPI 2021: >90 ML/MIN/1.73M2
EOSINOPHIL # BLD AUTO: 0.2 10E3/UL (ref 0–0.7)
EOSINOPHIL NFR BLD AUTO: 4 %
ERYTHROCYTE [DISTWIDTH] IN BLOOD BY AUTOMATED COUNT: 14.3 % (ref 10–15)
GLUCOSE SERPL-MCNC: 105 MG/DL (ref 70–99)
HCO3 SERPL-SCNC: 24 MMOL/L (ref 22–29)
HCT VFR BLD AUTO: 39.5 % (ref 40–53)
HGB BLD-MCNC: 12.3 G/DL (ref 13.3–17.7)
HOLD SPECIMEN: NORMAL
HOLD SPECIMEN: NORMAL
IMM GRANULOCYTES # BLD: 0 10E3/UL
IMM GRANULOCYTES NFR BLD: 1 %
LACTATE SERPL-SCNC: 1.2 MMOL/L (ref 0.7–2)
LYMPHOCYTES # BLD AUTO: 1.2 10E3/UL (ref 0.8–5.3)
LYMPHOCYTES NFR BLD AUTO: 23 %
MCH RBC QN AUTO: 20.2 PG (ref 26.5–33)
MCHC RBC AUTO-ENTMCNC: 31.1 G/DL (ref 31.5–36.5)
MCV RBC AUTO: 65 FL (ref 78–100)
MONOCYTES # BLD AUTO: 0.5 10E3/UL (ref 0–1.3)
MONOCYTES NFR BLD AUTO: 11 %
NEUTROPHILS # BLD AUTO: 3 10E3/UL (ref 1.6–8.3)
NEUTROPHILS NFR BLD AUTO: 62 %
NRBC # BLD AUTO: 0 10E3/UL
NRBC BLD AUTO-RTO: 0 /100
PLATELET # BLD AUTO: 467 10E3/UL (ref 150–450)
POTASSIUM SERPL-SCNC: 4 MMOL/L (ref 3.4–5.3)
RBC # BLD AUTO: 6.1 10E6/UL (ref 4.4–5.9)
SODIUM SERPL-SCNC: 138 MMOL/L (ref 135–145)
WBC # BLD AUTO: 4.9 10E3/UL (ref 4–11)

## 2025-03-19 PROCEDURE — 85041 AUTOMATED RBC COUNT: CPT | Performed by: NURSE PRACTITIONER

## 2025-03-19 PROCEDURE — 83605 ASSAY OF LACTIC ACID: CPT | Performed by: NURSE PRACTITIONER

## 2025-03-19 PROCEDURE — 36415 COLL VENOUS BLD VENIPUNCTURE: CPT | Performed by: NURSE PRACTITIONER

## 2025-03-19 PROCEDURE — 86140 C-REACTIVE PROTEIN: CPT | Performed by: NURSE PRACTITIONER

## 2025-03-19 PROCEDURE — G0463 HOSPITAL OUTPT CLINIC VISIT: HCPCS

## 2025-03-19 PROCEDURE — 85004 AUTOMATED DIFF WBC COUNT: CPT | Performed by: NURSE PRACTITIONER

## 2025-03-19 PROCEDURE — 80048 BASIC METABOLIC PNL TOTAL CA: CPT | Performed by: NURSE PRACTITIONER

## 2025-03-19 PROCEDURE — 82374 ASSAY BLOOD CARBON DIOXIDE: CPT | Performed by: NURSE PRACTITIONER

## 2025-03-19 PROCEDURE — 99214 OFFICE O/P EST MOD 30 MIN: CPT | Performed by: NURSE PRACTITIONER

## 2025-03-19 RX ORDER — PREDNISONE 20 MG/1
TABLET ORAL
Qty: 10 TABLET | Refills: 0 | Status: SHIPPED | OUTPATIENT
Start: 2025-03-19 | End: 2025-03-25

## 2025-03-19 ASSESSMENT — ENCOUNTER SYMPTOMS
SORE THROAT: 0
ABDOMINAL PAIN: 0
FEVER: 1
SHORTNESS OF BREATH: 1
COUGH: 1
NAUSEA: 0
VOMITING: 0
FATIGUE: 1
DIARRHEA: 1

## 2025-03-19 ASSESSMENT — ACTIVITIES OF DAILY LIVING (ADL): ADLS_ACUITY_SCORE: 41

## 2025-03-19 NOTE — TELEPHONE ENCOUNTER
Symptom or reason needing to speak to RN: UC follow      Best number to return call: [phone]     Best time to return call: soon

## 2025-03-19 NOTE — TELEPHONE ENCOUNTER
Patient is requesting to be seen next Wednesday morning.  Can you fit him in?      ER F/U  3/19/25    Community acquired pneumonia of left lower lobe of lung  Clinical impression Cough  Fever  Chief complain           Assessments & Plan (with Medical Decision Making)  This is a pleasant 39-year-old male that presented with concerns of persistent fevers given recent diagnosis of pneumonia.  Also felt that he should have been feeling a lot better by now after 24 hours of antibiotics.  Lab findings today without leukocytosis.  No electrolyte abnormalities.  Normal kidney function.  Elevated inflammatory marker.  Diagnosed with anemia last year but overall his hemoglobin, hematocrit, MCV, MCH, MCHC look  mostly around baseline or slightly improved within the last 6 to 9 months.  I could auscultate some crackles to the left lower lobe.  Respirations are even and unlabored.  His oxygen saturation was 98% on room air.  Patient denies any worsening shortness of breath.  Mostly concerned about cough that seems to get worse later in the day as well as the persistent fevers.  Considered possibility of worsening pneumonia but patient overall feels symptoms have not significantly changed and is mostly concerned about persistent fever with no additional antipyretics currently being taken.  Shared decision making was used and therefore x-ray was not repeated today which is reasonable.  I had a long discussion with patient providing some education on treatment for pneumonia as well as any additional medication he can take.  Recommended:  -Continue taking both azithromycin and doxycycline as prescribed.  -Continue using albuterol inhaler as needed for cough, shortness of breath or wheezing.  -Advised him to take Tylenol or ibuprofen as needed for fever (do not just rely on what is in DayQuil/NyQuil).  -Encouraged him to continue pushing fluids so he stays hydrated.  -Recommended Mucinex for cough and congestion which she can get  over-the-counter.  -Short course of prednisone as prescribed to also help with his symptoms.  We also discussed expected progression of symptoms with pneumonia including that sometimes it may take a few weeks for complete resolution of symptoms.  Fevers should gradually resolve.  Overall symptoms should be gradually improving over the course of the next couple of weeks with appropriate treatment.  He was advised to schedule an appointment with his primary doctor next week for recheck of his lungs as well as overall reevaluation of symptoms.  Strict return precautions to UC/ER discussed with patient who verbalized understanding and in agreement with plan of care.      I have reviewed the nursing notes.     I have reviewed the findings, diagnosis, plan and need for follow up with the patient.  This document was prepared using a combination of typing and voice generated software.  While every attempt was made for accuracy, spelling and grammatical errors may exist.                 New Prescriptions     PREDNISONE (DELTASONE) 20 MG TABLET    Take two tablets (= 40mg) each day for 5 (five) days

## 2025-03-19 NOTE — ED TRIAGE NOTES
Patient was evaluated in triage by Urgent Care provider ASHISH Ceron CNP and deemed appropriate for UC.    Patient to be seen in Urgent Care.    Pt presents reporting he was diagnosed with pneumonia on Monday. Pt reports that the provider stated  he should start feeling better in 24-48 hours. Pt states he's been taking the medications that were prescribed and an inhaler. Pt states he is still getting fevers at home, and last night had some shallow breathing. Pt states he still get SOB. Cough has improved since Monday. Pt has been taking nyquil and dayquil, no additional tylenol and ibuprofen. Pt also reports he has been taking Excedrin.

## 2025-03-19 NOTE — ED NOTES
Patient states he is still getting fevers that leave him covered in sweat and is still having significant coughing episodes. Denies SOB, but states that he can't take a deep breath still.

## 2025-03-19 NOTE — DISCHARGE INSTRUCTIONS
Continue taking 2 antibiotics along with using the albuterol inhaler as needed for wheezing, cough or shortness of breath.    Take the prednisone as prescribed.  Also recommend Mucinex for cough and congestion to help with your symptoms.    Continue drinking plenty of fluids so you are staying hydrated.  May also consider doing warm tea with lemon or honey to help soothe your cough.    Take 400 to 600 mg of ibuprofen every 4-6 hours as needed for the fever.  May alternate with 650 to 1000 mg of Tylenol.    Schedule an appointment with your primary doctor for reevaluation next week.    Please return to urgent care or emergency department for any worsening or concerning symptoms.   2

## 2025-03-19 NOTE — ED PROVIDER NOTES
History     Chief Complaint   Patient presents with    Cough    Fever     HPI  Odilon Enriquez is a 39 year old male who has a past medical history most significant for anemia and presents today with concerns of persistent pneumonia.  Patient was seen 2 days ago in this department and diagnosed with left lower lobe pneumonia.  Currently on azithromycin and doxycycline along with albuterol inhaler as needed.  Patient notes that he has been taking medications as prescribed.  He tells me he is still running fevers with last temperature recorded earlier this morning being slightly below between 100-101  F.  Initially notes that his cough appeared to be getting better but now notes that in the late afternoon to evening hours he gets intermittent coughing fits.  Denies any chest pain or significant shortness of breath other than when he is coughing.  No abdominal pain, N/V.  Some diarrhea patient reports that he stopped smoking cigarettes last year..  He is drinking fluids very well.  Last took DayQuil this morning prior to this arrival.  Not taking any additional antipyretics other than what is in DayQuil/NyQuil.    Patient mostly concerned that he is still running fevers with current symptoms.  Reports stopped smoking cigarettes last year.    Allergies:  Allergies   Allergen Reactions    Seasonal Allergies Itching     Sinus blockage       Problem List:    Patient Active Problem List    Diagnosis Date Noted    Anemia, unspecified-saw hematology, possible thalassemia-delcined genetic testing, recommend rechecking ferritin and CBC every 6 months. 09/20/2024     Priority: Medium        Past Medical History:    History reviewed. No pertinent past medical history.    Past Surgical History:    Past Surgical History:   Procedure Laterality Date    ENDOSCOPY UPPER, COLONOSCOPY, COMBINED N/A 7/18/2024    Procedure: Upper endoscopy with biopsy and colonoscopy;  Surgeon: Patrick Kumar MD;  Location: [facility]    VASECTOMY    "      Family History:    Family History   Problem Relation Age of Onset    Rheumatoid Arthritis Mother     Stomach Cancer Maternal Grandfather     Esophageal Cancer Maternal Grandfather        Social History:  Marital Status:   [2]  Social History     Tobacco Use    Smoking status: Former     Types: Cigarettes     Start date: 1/1/1997     Passive exposure: Past    Smokeless tobacco: Current     Types: Chew   Vaping Use    Vaping status: Former   Substance Use Topics    Alcohol use: Yes     Comment: every weekend    Drug use: Never        Medications:    albuterol (PROAIR HFA/PROVENTIL HFA/VENTOLIN HFA) 108 (90 Base) MCG/ACT inhaler  azelastine-fluticasone (DYMISTA) 137-50 MCG/ACT nasal spray  azithromycin (ZITHROMAX) 250 MG tablet  cetirizine (ZYRTEC) 10 MG tablet  doxycycline hyclate (VIBRAMYCIN) 100 MG capsule  predniSONE (DELTASONE) 20 MG tablet          Review of Systems   Constitutional:  Positive for fatigue and fever.   HENT:  Negative for sore throat.    Respiratory:  Positive for cough and shortness of breath (With coughing).    Cardiovascular:  Negative for chest pain.   Gastrointestinal:  Positive for diarrhea. Negative for abdominal pain, nausea and vomiting.   All other systems reviewed and are negative.      Physical Exam   BP: (!) 173/115  Pulse: 75  Temp: 97.3  F (36.3  C)  Resp: 12  Height: 180.3 cm (5' 11\")  Weight: 89.9 kg (198 lb 3.2 oz)  SpO2: 97 %      Physical Exam  Vitals and nursing note reviewed.   Constitutional:       General: He is not in acute distress.     Appearance: Normal appearance. He is well-developed. He is not diaphoretic.   HENT:      Head: Normocephalic and atraumatic.      Right Ear: Tympanic membrane and ear canal normal.      Left Ear: Tympanic membrane and ear canal normal.      Nose: Nose normal.      Mouth/Throat:      Mouth: Mucous membranes are moist.   Eyes:      Pupils: Pupils are equal, round, and reactive to light.   Cardiovascular:      Rate and Rhythm: " Normal rate and regular rhythm.      Heart sounds: Normal heart sounds.   Pulmonary:      Effort: Pulmonary effort is normal.      Breath sounds: Wheezing and rhonchi present.      Comments: Crackles auscultated to LLL  Musculoskeletal:      Cervical back: Normal range of motion and neck supple.   Skin:     General: Skin is warm and dry.      Coloration: Skin is not pale.   Neurological:      Mental Status: He is alert and oriented to person, place, and time.         ED Course        Procedures              Results for orders placed or performed during the hospital encounter of 03/19/25 (from the past 24 hours)   CBC with platelets differential    Narrative    The following orders were created for panel order CBC with platelets differential.  Procedure                               Abnormality         Status                     ---------                               -----------         ------                     CBC with platelets and ...[[id number]]  Abnormal            Final result                 Please view results for these tests on the individual orders.   Basic metabolic panel   Result Value Ref Range    Sodium 138 135 - 145 mmol/L    Potassium 4.0 3.4 - 5.3 mmol/L    Chloride 102 98 - 107 mmol/L    Carbon Dioxide (CO2) 24 22 - 29 mmol/L    Anion Gap 12 7 - 15 mmol/L    Urea Nitrogen 5.1 (L) 6.0 - 20.0 mg/dL    Creatinine 0.82 0.67 - 1.17 mg/dL    GFR Estimate >90 >60 mL/min/1.73m2    Calcium 9.5 8.8 - 10.4 mg/dL    Glucose 105 (H) 70 - 99 mg/dL   CRP inflammation   Result Value Ref Range    CRP Inflammation 21.54 (H) <5.00 mg/L   Lactic acid whole blood with 1x repeat in 2 hr when >2   Result Value Ref Range    Lactic Acid, Initial 1.2 0.7 - 2.0 mmol/L   CBC with platelets and differential   Result Value Ref Range    WBC Count 4.9 4.0 - 11.0 10e3/uL    RBC Count 6.10 (H) 4.40 - 5.90 10e6/uL    Hemoglobin 12.3 (L) 13.3 - 17.7 g/dL    Hematocrit 39.5 (L) 40.0 - 53.0 %    MCV 65 (L) 78 - 100 fL    MCH 20.2 (L)  26.5 - 33.0 pg    MCHC 31.1 (L) 31.5 - 36.5 g/dL    RDW 14.3 10.0 - 15.0 %    Platelet Count 467 (H) 150 - 450 10e3/uL    % Neutrophils 62 %    % Lymphocytes 23 %    % Monocytes 11 %    % Eosinophils 4 %    % Basophils 0 %    % Immature Granulocytes 1 %    NRBCs per 100 WBC 0 <1 /100    Absolute Neutrophils 3.0 1.6 - 8.3 10e3/uL    Absolute Lymphocytes 1.2 0.8 - 5.3 10e3/uL    Absolute Monocytes 0.5 0.0 - 1.3 10e3/uL    Absolute Eosinophils 0.2 0.0 - 0.7 10e3/uL    Absolute Basophils 0.0 0.0 - 0.2 10e3/uL    Absolute Immature Granulocytes 0.0 <=0.4 10e3/uL    Absolute NRBCs 0.0 10e3/uL   Extra Tube    Narrative    The following orders were created for panel order Extra Tube.  Procedure                               Abnormality         Status                     ---------                               -----------         ------                     Extra Blue Top Tube[[id number]]                             In process                 Extra Red Top Tube[[id number]]                              In process                   Please view results for these tests on the individual orders.   Extra Tube    Narrative    The following orders were created for panel order Extra Tube.  Procedure                               Abnormality         Status                     ---------                               -----------         ------                     Extra Blue Top Tube[[id number]]                                                        Extra Red Top Tube[[id number]]                                                           Please view results for these tests on the individual orders.       Medications - No data to display    Assessments & Plan (with Medical Decision Making)  This is a pleasant 39-year-old male that presented with concerns of persistent fevers given recent diagnosis of pneumonia.  Also felt that he should have been feeling a lot better by now after 24 hours of antibiotics.  Lab findings today without  leukocytosis.  No electrolyte abnormalities.  Normal kidney function.  Elevated inflammatory marker.  Diagnosed with anemia last year but overall his hemoglobin, hematocrit, MCV, MCH, MCHC look  mostly around baseline or slightly improved within the last 6 to 9 months.  I could auscultate some crackles to the left lower lobe.  Respirations are even and unlabored.  His oxygen saturation was 98% on room air.  Patient denies any worsening shortness of breath.  Mostly concerned about cough that seems to get worse later in the day as well as the persistent fevers.  Considered possibility of worsening pneumonia but patient overall feels symptoms have not significantly changed and is mostly concerned about persistent fever with no additional antipyretics currently being taken.  Shared decision making was used and therefore x-ray was not repeated today which is reasonable.  I had a long discussion with patient providing some education on treatment for pneumonia as well as any additional medication he can take.  Recommended:  -Continue taking both azithromycin and doxycycline as prescribed.  -Continue using albuterol inhaler as needed for cough, shortness of breath or wheezing.  -Advised him to take Tylenol or ibuprofen as needed for fever (do not just rely on what is in DayQuil/NyQuil).  -Encouraged him to continue pushing fluids so he stays hydrated.  -Recommended Mucinex for cough and congestion which she can get over-the-counter.  -Short course of prednisone as prescribed to also help with his symptoms.  We also discussed expected progression of symptoms with pneumonia including that sometimes it may take a few weeks for complete resolution of symptoms.  Fevers should gradually resolve.  Overall symptoms should be gradually improving over the course of the next couple of weeks with appropriate treatment.  He was advised to schedule an appointment with his primary doctor next week for recheck of his lungs as well as  overall reevaluation of symptoms.  Strict return precautions to UC/ER discussed with patient who verbalized understanding and in agreement with plan of care.     I have reviewed the nursing notes.    I have reviewed the findings, diagnosis, plan and need for follow up with the patient.  This document was prepared using a combination of typing and voice generated software.  While every attempt was made for accuracy, spelling and grammatical errors may exist.         New Prescriptions    PREDNISONE (DELTASONE) 20 MG TABLET    Take two tablets (= 40mg) each day for 5 (five) days       Final diagnoses:   Community acquired pneumonia of left lower lobe of lung       3/19/2025   [facility]       Mpofu, Prudence, CNP  03/19/25 0051

## 2025-03-21 NOTE — PROGRESS NOTES
"  Assessment & Plan     (J18.9) Community acquired pneumonia of left lower lobe of lung  (primary encounter diagnosis)  Comment: feeling much better  Plan: Will complete the antibiotics as prescribed. Will return with new or worsening symptoms.     (J30.2) Seasonal allergic rhinitis, unspecified trigger  Comment: controlled  Plan: cetirizine (ZYRTEC) 10 MG tablet        Continue Zyrtec.         MED REC REQUIRED  Post Medication Reconciliation Status:  Discharge medications reconciled, continue medications without change  BMI  Estimated body mass index is 28.59 kg/m  as calculated from the following:    Height as of 3/19/25: 1.803 m (5' 11\").    Weight as of this encounter: 93 kg (205 lb).         The longitudinal plan of care for the diagnosis(es)/condition(s) as documented were addressed during this visit. Due to the added complexity in care, I will continue to support Odilon in the subsequent management and with ongoing continuity of care.    Subjective   Odilon is a 39 year old, presenting for the following health issues:  ER F/U    HPI      ED/UC Followup:    Facility:  [facility]  Date of visit: 3/17/25 and 3/19/25  Reason for visit: CAP - both notes were reviewed; confirmed via CXR; intiailly treated with azithromycin and doxycycline, prednisone was then added at his second visit  Current Status: Today he notes that he is feeling better. Cough has improved. No fevers for 7 days. Completed the Azithromycin. Still on the doxycycline. Eating and drinking well. No nausea or vomiting. No rashes.     Allergies worsening, using Zyrtec with relief in his symptoms.     Review of Systems  Constitutional, HEENT, cardiovascular, pulmonary, gi and gu systems are negative, except as otherwise noted.      Objective    /80   Pulse 82   Temp 96.9  F (36.1  C) (Tympanic)   Resp 18   Wt 93 kg (205 lb)   SpO2 97%   BMI 28.59 kg/m    Body mass index is 28.59 kg/m .  Physical Exam   GENERAL: alert and no " distress  EYES: Eyes grossly normal to inspection, PERRL and conjunctivae and sclerae normal  HENT: ear canals and TM's normal, nose and mouth without ulcers or lesions  NECK: no adenopathy, no asymmetry, masses, or scars  RESP: lungs clear to auscultation - no rales, rhonchi or wheezes  CV: regular rate and rhythm, click or rub, no peripheral edema  NEURO: Normal strength and tone, mentation intact and speech normal  PSYCH: mentation appears normal, affect normal/bright            Signed Electronically by: Janeth Tinoco, NP

## 2025-03-25 ENCOUNTER — OFFICE VISIT (OUTPATIENT)
Dept: FAMILY MEDICINE | Facility: OTHER | Age: 40
End: 2025-03-25
Attending: NURSE PRACTITIONER
Payer: COMMERCIAL

## 2025-03-25 ENCOUNTER — TELEPHONE (OUTPATIENT)
Dept: FAMILY MEDICINE | Facility: OTHER | Age: 40
End: 2025-03-25

## 2025-03-25 VITALS
HEART RATE: 82 BPM | SYSTOLIC BLOOD PRESSURE: 124 MMHG | RESPIRATION RATE: 18 BRPM | BODY MASS INDEX: 28.59 KG/M2 | WEIGHT: 205 LBS | OXYGEN SATURATION: 97 % | TEMPERATURE: 96.9 F | DIASTOLIC BLOOD PRESSURE: 80 MMHG

## 2025-03-25 DIAGNOSIS — J18.9 COMMUNITY ACQUIRED PNEUMONIA OF LEFT LOWER LOBE OF LUNG: Primary | ICD-10-CM

## 2025-03-25 DIAGNOSIS — J30.2 SEASONAL ALLERGIC RHINITIS, UNSPECIFIED TRIGGER: ICD-10-CM

## 2025-03-25 RX ORDER — CETIRIZINE HYDROCHLORIDE 10 MG/1
10 TABLET ORAL 2 TIMES DAILY
Qty: 90 TABLET | Refills: 3 | Status: SHIPPED | OUTPATIENT
Start: 2025-03-25

## 2025-03-25 ASSESSMENT — PAIN SCALES - GENERAL: PAINLEVEL_OUTOF10: NO PAIN (0)

## 2025-03-25 NOTE — TELEPHONE ENCOUNTER
Received a PA request from Blanca Lanza for cetirizine (ZYRTEC) 10 MG tablet. Submitted on CMM and got the following message: Your patient will pay 100% of a discounted price for this medication. Any amount the patient pays will not apply to their deductible or out-of-pocket expenses.    Called [facility] and was advised that no PA is needed as the plan does not cover the medication.

## 2025-04-28 ENCOUNTER — OFFICE VISIT (OUTPATIENT)
Dept: FAMILY MEDICINE | Facility: OTHER | Age: 40
End: 2025-04-28
Attending: NURSE PRACTITIONER
Payer: COMMERCIAL

## 2025-04-28 ENCOUNTER — TELEPHONE (OUTPATIENT)
Dept: FAMILY MEDICINE | Facility: OTHER | Age: 40
End: 2025-04-28

## 2025-04-28 ENCOUNTER — ANCILLARY PROCEDURE (OUTPATIENT)
Dept: GENERAL RADIOLOGY | Facility: OTHER | Age: 40
End: 2025-04-28
Attending: NURSE PRACTITIONER
Payer: COMMERCIAL

## 2025-04-28 VITALS
BODY MASS INDEX: 28.45 KG/M2 | SYSTOLIC BLOOD PRESSURE: 135 MMHG | TEMPERATURE: 97.8 F | OXYGEN SATURATION: 100 % | WEIGHT: 204 LBS | RESPIRATION RATE: 16 BRPM | DIASTOLIC BLOOD PRESSURE: 87 MMHG | HEART RATE: 70 BPM

## 2025-04-28 DIAGNOSIS — R05.1 ACUTE COUGH: ICD-10-CM

## 2025-04-28 DIAGNOSIS — R05.1 ACUTE COUGH: Primary | ICD-10-CM

## 2025-04-28 LAB
BASOPHILS # BLD AUTO: 0 10E3/UL (ref 0–0.2)
BASOPHILS NFR BLD AUTO: 1 %
CRP SERPL-MCNC: 7.08 MG/L
EOSINOPHIL # BLD AUTO: 0.2 10E3/UL (ref 0–0.7)
EOSINOPHIL NFR BLD AUTO: 4 %
ERYTHROCYTE [DISTWIDTH] IN BLOOD BY AUTOMATED COUNT: 15.2 % (ref 10–15)
HCT VFR BLD AUTO: 37.6 % (ref 40–53)
HGB BLD-MCNC: 11.7 G/DL (ref 13.3–17.7)
IMM GRANULOCYTES # BLD: 0 10E3/UL
IMM GRANULOCYTES NFR BLD: 0 %
LYMPHOCYTES # BLD AUTO: 1.5 10E3/UL (ref 0.8–5.3)
LYMPHOCYTES NFR BLD AUTO: 25 %
MCH RBC QN AUTO: 20.2 PG (ref 26.5–33)
MCHC RBC AUTO-ENTMCNC: 31.1 G/DL (ref 31.5–36.5)
MCV RBC AUTO: 65 FL (ref 78–100)
MONOCYTES # BLD AUTO: 0.4 10E3/UL (ref 0–1.3)
MONOCYTES NFR BLD AUTO: 7 %
NEUTROPHILS # BLD AUTO: 3.8 10E3/UL (ref 1.6–8.3)
NEUTROPHILS NFR BLD AUTO: 63 %
NRBC # BLD AUTO: 0 10E3/UL
NRBC BLD AUTO-RTO: 0 /100
NT-PROBNP SERPL-MCNC: 76 PG/ML (ref 0–450)
PLATELET # BLD AUTO: 351 10E3/UL (ref 150–450)
RBC # BLD AUTO: 5.8 10E6/UL (ref 4.4–5.9)
WBC # BLD AUTO: 6 10E3/UL (ref 4–11)

## 2025-04-28 PROCEDURE — 71046 X-RAY EXAM CHEST 2 VIEWS: CPT | Performed by: RADIOLOGY

## 2025-04-28 PROCEDURE — 3079F DIAST BP 80-89 MM HG: CPT | Performed by: NURSE PRACTITIONER

## 2025-04-28 PROCEDURE — 85025 COMPLETE CBC W/AUTO DIFF WBC: CPT | Performed by: NURSE PRACTITIONER

## 2025-04-28 PROCEDURE — 1126F AMNT PAIN NOTED NONE PRSNT: CPT | Performed by: NURSE PRACTITIONER

## 2025-04-28 PROCEDURE — G2211 COMPLEX E/M VISIT ADD ON: HCPCS | Performed by: NURSE PRACTITIONER

## 2025-04-28 PROCEDURE — 99214 OFFICE O/P EST MOD 30 MIN: CPT | Performed by: NURSE PRACTITIONER

## 2025-04-28 PROCEDURE — 83880 ASSAY OF NATRIURETIC PEPTIDE: CPT | Performed by: NURSE PRACTITIONER

## 2025-04-28 PROCEDURE — 3075F SYST BP GE 130 - 139MM HG: CPT | Performed by: NURSE PRACTITIONER

## 2025-04-28 PROCEDURE — 36415 COLL VENOUS BLD VENIPUNCTURE: CPT | Performed by: NURSE PRACTITIONER

## 2025-04-28 PROCEDURE — 86140 C-REACTIVE PROTEIN: CPT | Performed by: NURSE PRACTITIONER

## 2025-04-28 RX ORDER — PREDNISONE 20 MG/1
40 TABLET ORAL DAILY
Qty: 10 TABLET | Refills: 0 | Status: SHIPPED | OUTPATIENT
Start: 2025-04-28 | End: 2025-05-03

## 2025-04-28 ASSESSMENT — PAIN SCALES - GENERAL: PAINLEVEL_OUTOF10: NO PAIN (0)

## 2025-04-28 NOTE — PROGRESS NOTES
Assessment & Plan     (R05.1) Acute cough  (primary encounter diagnosis)  Comment: patient with cough s/p URI, CXR clear, oxygen sats 100 percent, CRP and CBC unremarkable, BNP normal  Plan: XR Chest 2 Views, BNP-N terminal pro, CBC with platelets and differential, CRP, inflammation        Will treat with prednisone burst. He was made aware of the side effects. I do not feel he needs antibiotics as his CXR was clear. Symptomatic cares were encouraged. The patient will follow up with new or worsening symptoms.         The longitudinal plan of care for the diagnosis(es)/condition(s) as documented were addressed during this visit. Due to the added complexity in care, I will continue to support Odilon in the subsequent management and with ongoing continuity of care.    Subjective   Odilon is a 39 year old, presenting for the following health issues:  Pneumonia    HPI      Follow-Up Left Lower Lobe Pneumonia;    Patient was seen on 3/17/25 with URI symptoms. Diagnosed with pneumonia. Treated with azithromycin, doxycycline, and prednisone.     Today Odilon notes that he continues to have a persistent productive cough. Worse when he lays down. No fevers. Some nasal congestion that he attributes to his seasonal allergies.     Using albuterol 2-4 times per day. Does not feel it is helping.     Eating and drinking well.     Review of Systems  Constitutional, HEENT, cardiovascular, pulmonary, gi and gu systems are negative, except as otherwise noted.      Objective    /87 (BP Location: Right arm, Patient Position: Sitting, Cuff Size: Adult Large)   Pulse 70   Temp 97.8  F (36.6  C) (Tympanic)   Resp 16   Wt 92.5 kg (204 lb)   SpO2 100%   BMI 28.45 kg/m    Body mass index is 28.45 kg/m .  Physical Exam   GENERAL: alert and no distress  EYES: Eyes grossly normal to inspection, PERRL and conjunctivae and sclerae normal  HENT: ear canals and TM's normal, nose and mouth without ulcers or lesions  NECK: no adenopathy, no  asymmetry, masses, or scars  RESP: lungs clear to auscultation - no rales, rhonchi or wheezes   CV: regular rate and rhythm, no murmur, click or rub, no peripheral edema  ABDOMEN: soft, nontender, no hepatosplenomegaly, no masses and bowel sounds normal  MS: no gross musculoskeletal defects noted, no edema  NEURO: Normal strength and tone, mentation intact and speech normal  PSYCH: mentation appears normal, affect normal/bright    Results for orders placed or performed in visit on 04/28/25   XR Chest 2 Views     Status: None    Narrative    PROCEDURE:  XR CHEST 2 VIEWS    HISTORY:  Acute cough.     COMPARISON:  None.    FINDINGS:   The cardiac silhouette is normal in size. The pulmonary vasculature is  normal.  The lungs are clear. No pleural effusion or pneumothorax.      Impression    IMPRESSION:  No acute cardiopulmonary disease.      OSMEL HERNANDEZ MD         SYSTEM ID:  [id number]   Results for orders placed or performed in visit on 04/28/25   BNP-N terminal pro     Status: Normal   Result Value Ref Range    N Terminal Pro BNP Outpatient 76 0 - 450 pg/mL   CRP, inflammation     Status: Abnormal   Result Value Ref Range    CRP Inflammation 7.08 (H) <5.00 mg/L   CBC with platelets and differential     Status: Abnormal   Result Value Ref Range    WBC Count 6.0 4.0 - 11.0 10e3/uL    RBC Count 5.80 4.40 - 5.90 10e6/uL    Hemoglobin 11.7 (L) 13.3 - 17.7 g/dL    Hematocrit 37.6 (L) 40.0 - 53.0 %    MCV 65 (L) 78 - 100 fL    MCH 20.2 (L) 26.5 - 33.0 pg    MCHC 31.1 (L) 31.5 - 36.5 g/dL    RDW 15.2 (H) 10.0 - 15.0 %    Platelet Count 351 150 - 450 10e3/uL    % Neutrophils 63 %    % Lymphocytes 25 %    % Monocytes 7 %    % Eosinophils 4 %    % Basophils 1 %    % Immature Granulocytes 0 %    NRBCs per 100 WBC 0 <1 /100    Absolute Neutrophils 3.8 1.6 - 8.3 10e3/uL    Absolute Lymphocytes 1.5 0.8 - 5.3 10e3/uL    Absolute Monocytes 0.4 0.0 - 1.3 10e3/uL    Absolute Eosinophils 0.2 0.0 - 0.7 10e3/uL    Absolute Basophils  0.0 0.0 - 0.2 10e3/uL    Absolute Immature Granulocytes 0.0 <=0.4 10e3/uL    Absolute NRBCs 0.0 10e3/uL   CBC with platelets and differential     Status: Abnormal    Narrative    The following orders were created for panel order CBC with platelets and differential.  Procedure                               Abnormality         Status                     ---------                               -----------         ------                     CBC with platelets and ...[[id number]]  Abnormal            Final result                 Please view results for these tests on the individual orders.           Signed Electronically by: Janeth Tinoco NP

## 2025-04-28 NOTE — TELEPHONE ENCOUNTER
Pt is currently at work, will be an additional 20 mins. Informed pt of wait time, pt stated understanding.

## 2025-04-28 NOTE — TELEPHONE ENCOUNTER
8:35 AM    Reason for Call: OVERBOOK    Patient is having the following symptoms: follow up from pneumonia, still having symptoms, deep cough, phlegm       The patient is requesting an appointment for today, Tuesday, or Wednesday with RAMIRO Tinoco.    Was an appointment offered for this call? Yes  If yes : Appointment type              Date but heading out of town Thursday afternoon    Preferred method for responding to this message: Telephone Call  What is your phone number ? [phone]    If we cannot reach you directly, may we leave a detailed response at the number you provided? Yes    Can this message wait until your PCP/provider returns, if unavailable today? Mary Jane Dos Santos

## 2025-07-13 ENCOUNTER — HEALTH MAINTENANCE LETTER (OUTPATIENT)
Age: 40
End: 2025-07-13

## (undated) DEVICE — SOL WATER IRRIG 1000ML BOTTLE 2F7114

## (undated) DEVICE — SUCTION MANIFOLD NEPTUNE 2 SYS 1 PORT 702-025-000

## (undated) DEVICE — CONNECTOR ERBEFLO 2 PORT 20325-215

## (undated) DEVICE — TUBING SUCTION 20FT N620A

## (undated) DEVICE — FORCEPS BIOPSY RADIAL JAW 4 LARGE W/NEEDLE 240CM M00513332

## (undated) DEVICE — CANISTER SUCTION MEDI-VAC GUARDIAN 2000ML 90D 65651-220

## (undated) RX ORDER — PROPOFOL 10 MG/ML
INJECTION, EMULSION INTRAVENOUS
Status: DISPENSED
Start: 2024-07-18